# Patient Record
Sex: FEMALE | Race: WHITE | NOT HISPANIC OR LATINO | Employment: OTHER | URBAN - METROPOLITAN AREA
[De-identification: names, ages, dates, MRNs, and addresses within clinical notes are randomized per-mention and may not be internally consistent; named-entity substitution may affect disease eponyms.]

---

## 2018-07-10 ENCOUNTER — DOCUMENTATION ONLY (OUTPATIENT)
Dept: ORTHOPEDICS | Facility: CLINIC | Age: 68
End: 2018-07-10

## 2018-07-10 NOTE — PROGRESS NOTES
ELIN Sanchez arrived to the [place] location today, for an adj/eval for bilateral afo's.    O.Goal.  To help give ankle stability, reduce foot drop, and help with ADL's.  Pt. has CMT and is scheduled to see Dr. Francis next month.    A.  At this time, the patient arrived wearing bilateral PLS custom AFO's, in which she stated are approx. 8 years old.  The right afo has a large crack on the Achilles area.  I recommended that she have this brace replaced ASAP and made her aware that it could break completely at any time.  She has a pair of braces that are approximately 1 year old she received from a different O&P location in [place].  She stated that she has never worn them because they cause pain and that she does not feel comfortable with the company that made them.  I did have her try on the right one and found it to be very tight on the lateral and medial portion.  I do not see a relief for her prominent navicular.  I have recommended that she go back to the company that made these and see if they would work with her on adjustments or refabricating a new set.  She stated that she would rather work with me on a different pair.  The patient has had a right fusion.  She also states that she will be getting a right knee replacement in the future.  She has genu valgum.  Pt. has weak dorsi strength bilateral and has bilateral pes planus, but the right side is much more extreme.  The patient has asked about turbomed afo's.  The may work well for her being she is used to PLS AFO's already.  The plan is to try bilateral Turbomed AFO's with custom FO's at her next appt.  If they do not work well for her, then I will cast her for new custom PLS AFO's with removable fo's.  Pt. wears size 8.5 shoes.  I have taken biofoam impressions and will order Turbomed afo's in her size.    P.  Pt. is schedule to arrive back in 1 week for a fitting.  Pt. has been instructed to contact our facility with any future questions and/or concerns.

## 2018-07-17 ENCOUNTER — DOCUMENTATION ONLY (OUTPATIENT)
Dept: ORTHOPEDICS | Facility: CLINIC | Age: 68
End: 2018-07-17

## 2018-07-17 NOTE — PROGRESS NOTES
S.  Patient arrived to our [place] location today for a fitting of new bilateral TurboMed AFO's.    O.Goal.  To help reduce foot drop and help with ambulation/stairs/adl's.    A.  At this time, I have assembled/modified the AFO s to fit over her hiking boots.    I have applied the attachment kit to the footwear and also provided/fit bilateral custom fo's (xpe with puff top covers) to fit into her footwear to give her arch support and protection.  After the braces were assembled/fit, I have instructed the patient proper donning, doffing, cleaning, attachments, wear-in period, and skin care/observation.  The patient was very satisfied with the overall fit/comfort.  Pt. did not show any signs of foot drop during gait with the braces on.  I have provided the  s instructions with the braces.  Pt. was very happy with the braces overall and did get a little choked up because of how exited she was with the new braces.    P.  Pt. has been instructed to contact our facility with any future questions and/or concerns.    Raymundo HILLMAN [facility] Licensed Orthotist , ABC Certified Orthotist

## 2018-07-31 ENCOUNTER — PRE VISIT (OUTPATIENT)
Dept: NEUROLOGY | Facility: CLINIC | Age: 68
End: 2018-07-31

## 2018-07-31 DIAGNOSIS — G60.9 HEREDITARY AND IDIOPATHIC PERIPHERAL NEUROPATHY: Primary | ICD-10-CM

## 2018-07-31 RX ORDER — ALBUTEROL SULFATE 0.83 MG/ML
2.5 SOLUTION RESPIRATORY (INHALATION) EVERY 6 HOURS PRN
COMMUNITY

## 2018-07-31 RX ORDER — ESTRADIOL 0.1 MG/G
0.1 CREAM VAGINAL
COMMUNITY

## 2018-07-31 RX ORDER — ALBUTEROL SULFATE 90 UG/1
1-2 AEROSOL, METERED RESPIRATORY (INHALATION) EVERY 4 HOURS PRN
COMMUNITY

## 2018-07-31 RX ORDER — FLUTICASONE PROPIONATE 50 MCG
2 SPRAY, SUSPENSION (ML) NASAL DAILY PRN
COMMUNITY

## 2018-07-31 NOTE — TELEPHONE ENCOUNTER
CMT NEW PATIENT    Family History of CMT? Father, grandmother and possibly more    When did their symptoms start? Clumsy in teen years. Diagnosed in 20s    Who initially gave them the diagnosis? A neurologist at [facility] in the 70s. Saw another neurologist in the 90's at [facility]. PCP, orthotist and podiatrist has been managing her CMT    Has genetic testing been completed? If yes, what type of CMT do they have? No    Hammertoes? Yes    [place]? Previous ankle and arch surgeries     Ankle weakness/foot drop? Yes    Hand weakness? Issues with fine motor     Currently using any assistive devices? Walking sticks when hiking or on uneven ground.     Wearing orthotics (AFO s or foot orthotics)? Yes    Records needed:     Previous EMG report: Yes has had 2 at [facility]    Genetic test results N/A    Office visit notes from previous neurologists that pertain to CMT: should be coming     MRI imaging of the brain and spine (need report and images pushed/archived).     What to expect during your visit:    The CMT clinic takes place every 1st Tuesday of the month. It is a multi-disciplinary clinic where the pt sees multiple specialists during their visit. The team consists of the neurologist, physical therapist, occupational therapist, orthotist, genetic counselor,  and a representative from the [employer] .     If they are scheduled in the morning they will be here from about 8:30am to about 12:00pm, this may vary depending on how the schedule falls.    If they are scheduled in the afternoon, we will need them here from 12:15pm to about 4:30pm.    You will be receiving a new pt packet in the mail. It is imperative that you have this completed and can bring this with you to the appt. Dr Francis requires that all new pt s fill this questionnaire out.   We will be in touch with you closer to the appt date to update your chart and complete a pre-visit .

## 2018-08-07 ENCOUNTER — HOSPITAL ENCOUNTER (OUTPATIENT)
Dept: PHYSICAL THERAPY | Facility: CLINIC | Age: 68
Setting detail: THERAPIES SERIES
End: 2018-08-07
Attending: OCCUPATIONAL THERAPIST
Payer: COMMERCIAL

## 2018-08-07 ENCOUNTER — OFFICE VISIT (OUTPATIENT)
Dept: NURSING | Facility: CLINIC | Age: 68
End: 2018-08-07
Payer: COMMERCIAL

## 2018-08-07 ENCOUNTER — HOSPITAL ENCOUNTER (OUTPATIENT)
Dept: OCCUPATIONAL THERAPY | Facility: CLINIC | Age: 68
Setting detail: THERAPIES SERIES
End: 2018-08-07
Attending: OCCUPATIONAL THERAPIST
Payer: COMMERCIAL

## 2018-08-07 ENCOUNTER — DOCUMENTATION ONLY (OUTPATIENT)
Dept: ORTHOPEDICS | Facility: CLINIC | Age: 68
End: 2018-08-07

## 2018-08-07 ENCOUNTER — OFFICE VISIT (OUTPATIENT)
Dept: NEUROLOGY | Facility: CLINIC | Age: 68
End: 2018-08-07
Payer: COMMERCIAL

## 2018-08-07 VITALS
OXYGEN SATURATION: 95 % | SYSTOLIC BLOOD PRESSURE: 145 MMHG | HEART RATE: 66 BPM | HEIGHT: 63 IN | BODY MASS INDEX: 31.89 KG/M2 | DIASTOLIC BLOOD PRESSURE: 75 MMHG | WEIGHT: 180 LBS

## 2018-08-07 DIAGNOSIS — G60.0 CMT (CHARCOT-MARIE-TOOTH DISEASE): Primary | ICD-10-CM

## 2018-08-07 DIAGNOSIS — G60.0 PERONEAL MUSCULAR ATROPHY: Primary | ICD-10-CM

## 2018-08-07 LAB — MISCELLANEOUS TEST: NORMAL

## 2018-08-07 PROCEDURE — 40000250 ZZH STATISTIC VISIT MD CLINIC: Performed by: OCCUPATIONAL THERAPIST

## 2018-08-07 PROCEDURE — 97161 PT EVAL LOW COMPLEX 20 MIN: CPT | Mod: GP | Performed by: PHYSICAL THERAPIST

## 2018-08-07 PROCEDURE — 97165 OT EVAL LOW COMPLEX 30 MIN: CPT | Mod: GO | Performed by: OCCUPATIONAL THERAPIST

## 2018-08-07 PROCEDURE — 97535 SELF CARE MNGMENT TRAINING: CPT | Mod: GO | Performed by: OCCUPATIONAL THERAPIST

## 2018-08-07 PROCEDURE — 97530 THERAPEUTIC ACTIVITIES: CPT | Mod: GP | Performed by: PHYSICAL THERAPIST

## 2018-08-07 PROCEDURE — 99202 OFFICE O/P NEW SF 15 MIN: CPT | Performed by: PSYCHIATRY & NEUROLOGY

## 2018-08-07 PROCEDURE — 40000250 ZZH STATISTIC VISIT MD CLINIC: Performed by: PHYSICAL THERAPIST

## 2018-08-07 NOTE — NURSING NOTE
"Laura Vann's goals for this visit include:   Chief Complaint   Patient presents with     Consult     She requests these members of her care team be copied on today's visit information: PCP    PCP: Tatiana Del Castillo    Referring Provider:  Referred Self, MD  No address on file    /75 (BP Location: Left arm, Patient Position: Sitting, Cuff Size: Adult Large)  Pulse 66  Ht 1.588 m (5' 2.5\")  Wt 81.6 kg (180 lb)  SpO2 95%  BMI 32.4 kg/m2    Do you need any medication refills at today's visit? n  "

## 2018-08-07 NOTE — PROGRESS NOTES
OUTPATIENT PHYSICAL THERAPY CLINIC NOTE  Laura Vann     YOB: 1950  [id number]    Type of visit:         Evaluation     Date of service: 8/7/2018    Referring provider: Adrian Francis MD    Others present at visit:  None    Medical diagnosis:   CMT    Date of diagnosis: 1976    Pertinent history of current problem (include personal factors and/or comorbidities that impact the plan of care): Now wearing the Turbo Med braces. She likes to garden and ride bike.  She works full time but is retiring in October.  She currently lives in [place], [place].  She reports increased standing balance with her new braces.  Right ankle fusion and surgery on hammer toe.    Cardio-respiratory status:  No concerns      Living environment:  House    Living environment barriers:  12 stairs within home (1 railing present).  Lives in an old farmhouse.  2 step entrance with rail     Current assistance/living environment:  Lives with spouse      Current mobility equipment:  Orthotics: Turbo Meds  Walking sticks     Current ADL equipment:  Extended tub bench    Technology used: Smart phone, computer.  Works in [employer]    Patient concerns/goals: Gait efficiency with new braces.    Evaluation   Interview completed.   Pain assessment:  Pain denied  Right knee discomfort when she is on her feet.  She knows she needs a Right TKA and has met with an orthopedist.     Range of motion: Right ankle decreased active movement due to fusion     Manual muscle testing:  Bilateral ankle dorsiflexors=1/5, right knee flexion/extension=4+/5, left knee flexion/extension=5/5 bilateral hip flexion, abduction and adduction=5/5   Gait:  Bilateral Turbo braces with limited arm swing and trendelenberg gait   25 Foot Timed Walk= 7.19 seconds taking 14 steps with bilateral Turbo braces.   Cognition:  None       Fall Risk Screen:   Has the patient fallen 2 or more times in the last year? No      Has the patient fallen and had an injury in the past year?  No       Timed Up and Go Score: 8.83    Is the patient a fall risk? Yes, department fall risk interventions implemented     Impairments:  Fatigue  Muscle atrophy  Balance  Range of motion     Treatment diagnosis:  Impaired mobility  Impaired activities of daily living    Clinical Presentation: Evolving/Changing  Clinical Presentation Rationale: Increased need for walking poles for gait, decreased balance, fatigue  Clinical Decision Making (Complexity): Low complexity     Recommendations/Plan of care:  1 session evaluation & treatment.     Goals:   Target date: 8/7/18  Patient, family and/or caregiver will verbalize understanding of evaluation results and implications for functional performance.  Patient, family and/or caregiver will verbalize/demonstrate understanding of home program.  Patient, family and/or caregiver will verbalize energy management techniques appropriate for status and setting.    Educational assessment/barriers to learning:   No barriers noted     Treatment provided this date:   Therapeutic activities, 15 minutes    Response to treatment/recommendations: Laura was instructed in and performed standing heelcord, gastroc and soleus lengthening with picture given on 1st toe flexion.  She is able to demonstrate standing balance activities of single leg stance with UE support and marching.  Educated and instructed on implementing energy conservation techniques of breaking up tasks and frequent position changes.  She will look into recumbent bike options for exercise and walking with walking poles.    Goal attainment:  All goals met     Risks and benefits of evaluation/treatment have been explained.  Patient, family and/or caregiver are in agreement with Plan of Care.     Timed Code Treatment Minutes: 15    Total Treatment Time (sum of timed and untimed services): 45    Signature: Armida Ying, PT   Date: 8/7/2018

## 2018-08-07 NOTE — PROGRESS NOTES
OUTPATIENT OCCUPATIONAL THERAPY CLINIC NOTE  Laura Vann     YOB: 1950  [id number]    Type of visit:  Evaluation and Treatment            Date of service: 2018    Referring provider: Dr. Adrian Francis    Others present at visit: None    Medical diagnosis: CMT      Date of diagnosis:     Additional Occupational Profile Information (patterns of daily living, interests, values and needs): Now wearing the Turbo Med braces. She likes to garden and ride bike. Right ankle fusion and surgery on hammer toe.   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    Living environment:  House     Living environment barriers:  12 stairs within home (1 railing present).  Lives in an old farmhouse.  2 step entrance with rail      Current assistance/living environment:  Lives with spouse.   has to assist with clasping jewelry.   She avoids fastening buttons      Current mobility equipment:  Orthotics: Turbo Meds  Walking sticks (occasional)      Current ADL equipment:  Extended tub bench (has available but not currently using)  Uses jar openers     Technology used: Smart phone, computer.  Works in [employer]     Patient concerns/goals: decreasing grasp for opening containers, clasping jewelry. Pt has a lot of dificulty with buttons so primarily avoids fastening them.     Evaluation   Interview completed.    Range of motion:  WFL    Manual muscle testing:  strength testin lb R hand, 40 lb left hand.  Intrinsic hand weakness 4/5    Cognition:  WNL      Fall Risk Screen:   Has the patient fallen 2 or more times in the last year? No                            Has the patient fallen and had an injury in the past year? No                Timed Up and Go Score: Defer to PT     Is the patient a fall risk? Yes, department fall risk interventions implemented      Impairments:  Fatigue  Muscle atrophy  Balance  Range of motion      Treatment diagnosis:  Impaired mobility  Impaired activities  "of daily living     Assessment of Occupational Performance: 1-3 Performance Deficits  Identified Performance Deficits (ie: feeding, social skills): dressing, grooming, household management  Clinical Decision Making (Complexity): Low complexity     Recommendations/Plan of care:  Patient would benefit from interventions to enhance safety and independence.  Rehab potential good for stated goals.  1 session evaluation & treatment.    Goals:   Target date:   Patient, family and/or caregiver will verbalize understanding of evaluation results and implications for functional performance.  Patient, family and/or caregiver will verbalize/demonstrate understanding of compensatory methods /equipment to enhance functional independence and safety.  Patient, family and/or caregiver will verbalize/demonstrate understanding of home program.  Patient, family and/or caregiver will verbalize/demonstrate understanding of positioning techniques/equipment.      Educational assessment/barriers to learning:  No barriers noted      Treatment provided this date:   Self care/home management, 15 minutes. Educated on strategies and adaptive equipment to compensate for decreased pinch strength, including button aide and/or \"pocket dresser,\"  Zipper pulls, multi-use opener for cans/bottles. Educated on use of Pen Again, which pt reports having previous experience with.     Response to treatment/recommendations: Pt demo'd understanding of all instruction    Goal attainment:  All goals met    Risks and benefits of evaluation/treatment have been explained.  Patient, family and/or caregiver are in agreement with Plan of Care.     Timed Code Treatment Minutes: 15  Total Treatment Time (sum of timed and untimed services): 30    Signature: SOWMYA Murcia/L  Date: 8/7/2018  "

## 2018-08-07 NOTE — MR AVS SNAPSHOT
After Visit Summary   2018    Laura Vann    MRN: [id number]           Patient Information     Date Of Birth          1950        Visit Information        Provider Department      2018 1:00 PM Tosha Galvan [facility]        Today's Diagnoses     Peroneal muscular atrophy    -  1       Follow-ups after your visit        Who to contact     If you have questions or need follow up information about today's clinic visit or your schedule please contact [facility] directly at [phone].  Normal or non-critical lab and imaging results will be communicated to you by VoAPPshart, letter or phone within 4 business days after the clinic has received the results. If you do not hear from us within 7 days, please contact the clinic through VoAPPshart or phone. If you have a critical or abnormal lab result, we will notify you by phone as soon as possible.  Submit refill requests through StreetFire or call your pharmacy and they will forward the refill request to us. Please allow 3 business days for your refill to be completed.          Additional Information About Your Visit        MyChart Information     Street[employer] is an electronic gateway that provides easy, online access to your medical records. With StreetFire, you can request a clinic appointment, read your test results, renew a prescription or communicate with your care team.     To sign up for StreetFire visit the website at [email or web address]   You will be asked to enter the access code listed below, as well as some personal information. Please follow the directions to create your username and password.     Your access code is: [email or web address]  Expires: 2018  3:18 PM     Your access code will  in 90 days. If you need help or a new code, please contact your [facility] or call [phone] for assistance.        Care EveryWhere ID     This is your Care EveryWhere ID. This  could be used by other organizations to access your [place] medical records  [id number]         Blood Pressure from Last 3 Encounters:   08/07/18 145/75    Weight from Last 3 Encounters:   08/07/18 81.6 kg (180 lb)              We Performed the Following     Comprehensive Neuropathy Panel: Laboratory Miscellaneous Order     Send outs misc test        Primary Care Provider Office Phone # Fax #    Tatiana Beasleyncer, CHRISTIAN [phone]       [facility] [address]   [facility] [address]        Equal Access to Services     [facility]: Hadii aad ku hadasho Soomaali, waaxda luqadaha, qaybta kaalmada adeegyada, waxay idiin hayaan adeeg kharabryan laPritiaafrankie . So [facility] [phone].    ATENCIÓN: Si habla español, tiene a tan disposición servicios gratuitos de asistencia lingüística. [facility] [phone].    We comply with applicable federal civil rights laws and [place] laws. We do not discriminate on the basis of race, color, national origin, age, disability, sex, sexual orientation, or gender identity.            Thank you!     Thank you for choosing [facility]  for your care. Our goal is always to provide you with excellent care. Hearing back from our patients is one way we can continue to improve our services. Please take a few minutes to complete the written survey that you may receive in the mail after your visit with us. Thank you!             Your Updated Medication List - Protect others around you: Learn how to safely use, store and throw away your medicines at www.disposemymeds.org.          This list is accurate as of 8/7/18 11:59 PM.  Always use your most recent med list.                   Brand Name Dispense Instructions for use Diagnosis    * albuterol 108 (90 Base) MCG/ACT Inhaler    PROAIR HFA/PROVENTIL HFA/VENTOLIN HFA     Inhale 1-2 puffs into the lungs every 4 hours as needed for shortness of breath / dyspnea or wheezing    Hereditary and idiopathic peripheral  neuropathy       * albuterol (2.5 MG/3ML) 0.083% neb solution      Take 2.5 mg by nebulization every 6 hours as needed for shortness of breath / dyspnea or wheezing    Hereditary and idiopathic peripheral neuropathy       estradiol 0.1 MG/GM cream    ESTRACE     Place 0.1 g vaginally three times a week    Hereditary and idiopathic peripheral neuropathy       fluticasone 50 MCG/ACT spray    FLONASE     Spray 2 sprays into both nostrils daily as needed for rhinitis or allergies    Hereditary and idiopathic peripheral neuropathy       loratadine-pseudoePHEDrine 5-120 MG per 12 hr tablet    CLARITIN-D 12-hour     Take 1 tablet by mouth 2 times daily as needed for allergies    Hereditary and idiopathic peripheral neuropathy       mometasone-formoterol 100-5 MCG/ACT oral inhaler    DULERA     Inhale 2 puffs into the lungs 2 times daily    Hereditary and idiopathic peripheral neuropathy       MULTIVITAMIN ADULT PO      Take 1 tablet by mouth daily    Hereditary and idiopathic peripheral neuropathy       TAPAZOLE PO      Take 2.5 mg by mouth three times a week    Hereditary and idiopathic peripheral neuropathy       VITAMIN D (CHOLECALCIFEROL) PO      Take 4,000 Units by mouth daily    Hereditary and idiopathic peripheral neuropathy       * Notice:  This list has 2 medication(s) that are the same as other medications prescribed for you. Read the directions carefully, and ask your doctor or other care provider to review them with you.

## 2018-08-07 NOTE — DISCHARGE INSTRUCTIONS
"OT recommendations from 2018    1. Zipper pulls  2. Dressing aides:  Pocket [place] or \"button aide\"  3. Pen Again writing tool  4. Spring-loaded tweezers (available in the beading section at a [facility] store)    Ad Lopez OTR/L  [facility]  [email or web address], Phone: [phone]  [phone]    "

## 2018-08-07 NOTE — PATIENT INSTRUCTIONS
Stop at the lab on your way out today. Tosha Galvan will be in touch with you regarding results.    Return in 1 year.

## 2018-08-07 NOTE — MR AVS SNAPSHOT
After Visit Summary   2018    Laura Vann    MRN: [id number]           Patient Information     Date Of Birth          1950        Visit Information        Provider Department      2018 2:00 PM Adrian Francis MD [facility]        Care Instructions    Stop at the lab on your way out today. Tosha Galvan will be in touch with you regarding results.    Return in 1 year.           Follow-ups after your visit        Follow-up notes from your care team     Return in about 1 year (around 2019).      Who to contact     If you have questions or need follow up information about today's clinic visit or your schedule please contact [facility] directly at [phone].  Normal or non-critical lab and imaging results will be communicated to you by MyChart, letter or phone within 4 business days after the clinic has received the results. If you do not hear from us within 7 days, please contact the clinic through Zeetlhart or phone. If you have a critical or abnormal lab result, we will notify you by phone as soon as possible.  Submit refill requests through Kutenda or call your pharmacy and they will forward the refill request to us. Please allow 3 business days for your refill to be completed.          Additional Information About Your Visit        MyChart Information     Kutenda is an electronic gateway that provides easy, online access to your medical records. With Kutenda, you can request a clinic appointment, read your test results, renew a prescription or communicate with your care team.     To sign up for Kutenda visit the website at [email or web address]   You will be asked to enter the access code listed below, as well as some personal information. Please follow the directions to create your username and password.     Your access code is: [email or web address]  Expires: 2018  3:18 PM     Your access code will  in 90 days. If you need help or a new  "code, please contact your [facility] or call [phone] for assistance.        Care EveryWhere ID     This is your Care EveryWhere ID. This could be used by other organizations to access your [facility] medical records  [id number]        Your Vitals Were     Pulse Height Pulse Oximetry BMI (Body Mass Index)          66 5' 2.5\" (1.588 m) 95% 32.4 kg/m2         Blood Pressure from Last 3 Encounters:   08/07/18 145/75    Weight from Last 3 Encounters:   08/07/18 180 lb (81.6 kg)              Today, you had the following     No orders found for display       Primary Care Provider Office Phone # Fax #    Tatiana Del Castillo [phone]       [address]   [facility] [address]        Equal Access to Services     [facility] : Hadii aad ku hadasho Soomaali, waaxda luqadaha, qaybta kaalmada adeegyada, waxay idiin hayaan adeanna kharabryan lagos . So [facility] [phone].    ATENCIÓN: Si habla español, tiene a tan disposición servicios gratuitos de asistencia lingüística. Robert al [phone].    We comply with applicable federal civil rights laws and [place] laws. We do not discriminate on the basis of race, color, national origin, age, disability, sex, sexual orientation, or gender identity.            Thank you!     Thank you for choosing [facility]  for your care. Our goal is always to provide you with excellent care. Hearing back from our patients is one way we can continue to improve our services. Please take a few minutes to complete the written survey that you may receive in the mail after your visit with us. Thank you!             Your Updated Medication List - Protect others around you: Learn how to safely use, store and throw away your medicines at www.disposemymeds.org.          This list is accurate as of 8/7/18  3:18 PM.  Always use your most recent med list.                   Brand Name Dispense Instructions for use " Diagnosis    * albuterol 108 (90 Base) MCG/ACT Inhaler    PROAIR HFA/PROVENTIL HFA/VENTOLIN HFA     Inhale 1-2 puffs into the lungs every 4 hours as needed for shortness of breath / dyspnea or wheezing    Hereditary and idiopathic peripheral neuropathy       * albuterol (2.5 MG/3ML) 0.083% neb solution      Take 2.5 mg by nebulization every 6 hours as needed for shortness of breath / dyspnea or wheezing    Hereditary and idiopathic peripheral neuropathy       estradiol 0.1 MG/GM cream    ESTRACE     Place 0.1 g vaginally three times a week    Hereditary and idiopathic peripheral neuropathy       fluticasone 50 MCG/ACT spray    FLONASE     Spray 2 sprays into both nostrils daily as needed for rhinitis or allergies    Hereditary and idiopathic peripheral neuropathy       loratadine-pseudoePHEDrine 5-120 MG per 12 hr tablet    CLARITIN-D 12-hour     Take 1 tablet by mouth 2 times daily as needed for allergies    Hereditary and idiopathic peripheral neuropathy       mometasone-formoterol 100-5 MCG/ACT oral inhaler    DULERA     Inhale 2 puffs into the lungs 2 times daily    Hereditary and idiopathic peripheral neuropathy       MULTIVITAMIN ADULT PO      Take 1 tablet by mouth daily    Hereditary and idiopathic peripheral neuropathy       TAPAZOLE PO      Take 2.5 mg by mouth three times a week    Hereditary and idiopathic peripheral neuropathy       VITAMIN D (CHOLECALCIFEROL) PO      Take 4,000 Units by mouth daily    Hereditary and idiopathic peripheral neuropathy       * Notice:  This list has 2 medication(s) that are the same as other medications prescribed for you. Read the directions carefully, and ask your doctor or other care provider to review them with you.

## 2018-08-07 NOTE — LETTER
October 18, 2018       TO: Laura Vann  [address] [place] [address]       DearMs.Edwar,    I am writing in regards to your genetic test results.  As you know you were seen by Dr. 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  A copy of the results are below.    There was a variant identified in the following gene associated with recessive conditions: SPG11.  Recessive conditions are caused by a variant in both copies of the gene within the pair.  Genetic testing has only identified a variant in one copy of the gene.  Therefore this variant is is not likely to cause your symptoms.  It could mean you are a carrier or at risk to have child affected with this condition.   The variant(s) is/are classified as variants of unknown significance (VOUS).        SPG11 variant is associated hereditary spastic parapelegia.  This condition is characterized by  progressive muscle stiffness (spasticity) and the development of paralysis of the lower limbs (paraplegia).  Having a variant in once copy of the gene within the pair implies that the individual is a carrier, if the variant is disease causing.    In all recessive conditions, both parents have to be carriers to have an affected child.  When both parents are carriers there is 25% chance, in each pregnancy, to have a children who at risk to develop the condition.   Your first degree relatives (siblings, parents and children) have a 1 out of 2 or 50% of also possessing the variant identified.      There are many different features of CMTX; however, most do not occur in every person with the condition. The severity of symptoms varies greatly. Every person with CMTX is unique in how the condition affects him or her.    Signs and symptoms of CMTX may include the following:    Characterized  by a moderate to severe motor and sensory neuropathy    There is a range in severity some individuals with CMTX mutations have mild or no symptoms.  The severity can range within a family.    Symptoms typically develop between age five and 25 years, with onset commonly within the first decade in males.     Hearing loss is occasionally reported and auditory evoked potentials may be abnormal    CMTX represents at least 10%-20% of those affected with the CMT    Occasional signs of central nervous system involvement have been reported.    CMTX is caused by a variant or harmful change, in the GJB1 gene. Genes are units of information that instruct the body how to grow and develop. This gene is responsible for making a protein that resides in special cells that surround the peripheral nerves called Schwann cells. Schwann cells produce myelin, which is a substance that functions like insulation around electrical wires. When the Schwann cells are not working correctly, myelin is not produced correctly, and the nerve signals take much longer to reach the muscle. If the muscles are not efficiently getting signals from the nerves they will eventually become weak. 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 The variant found in your DNA is technically referred to as c.435C>T, Ofp32Itl. Now that we have identified the GJB1 variant in your DNA, we have the ability to test other family members.  Also this allows for prenatal testing and preimplantation diagnosis.  This diagnosis can also be established by clinic evaluation and genetic testing is not essential to confirming the diagnosis.  Genetic confirmation may be necessary for involvement in future clinical trials.    CMTX 'runs in families' in a X-linked pattern.  The GJB1 gene is located on the X chromosomes, which is one of the sex  chromosomes. Males have an X and Y chromosome, while females have 2 X chromosomes. In X-linked Dominant condition all individuals who possess the mutation will develop some symptoms regardless of gender; however their risk to pass it on is dependent on the gender. Females have 50% chance of passing on the mutation to either their sons or daughters. All the daughters of an affected Male will possess the mutation and none of his sons will inherit the mutation. There are typically affected individuals in each generation; however there is no father to son transmission.       The followings recommendations for individuals affect with CMTX:  1. Genetic testing is available to other family members if they are interested.  2. Some medications can be potentially toxic to individuals with CMT and should be avoided.  An up-to-date list is available at https://www.ncbi.nlm.nih.gov/books/VUE4291/bin/cmt_and_medications.pdf    3. There are several research studies being done in CMT, and more information about this studies are available at http://www.cmtausa.org/research/cmt-clinical-trial-registry/      It was a pleasure to meet Laura in the [facility] clinic, and we look forward to seeing you at your follow-up appointment. In the meantime, if you have any additional questions or concerns, please feel free to contact me at [phone].    Sincerely,    Tosha Galvan MS, [facility]  Genetic Counselor  Phone: [phone]      Resulted Orders   Comprehensive Neuropathy Panel: Laboratory Miscellaneous Order   Result Value Ref Range    Miscellaneous Test         Specimen Received, Reordered and sent to Performing laboratory - Report to follow upon   completion.     Send outs misc test   Result Value Ref Range    Lab Scanned Result SEND OUTS MISC TEST-Scanned (A)

## 2018-08-07 NOTE — LETTER
2018         RE: Laura Vann  [address]  [facility] [address]        Dear Colleague,    Thank you for referring your patient, Laura Vann, to the [facility]. Please see a copy of my visit note below.    182021    2018      Tatiana Del Castillo NP   [facility] [address]      Patient:  Laura Vann   MRN:  [id number]   :  1950      Dear Dr. Del Castillo:      I saw Laura Vann in neuromuscular consultation as a new patient at the [facility], where she presented for evaluation and management of CMT.  Ms. Vann is a 67-year-old woman with a decades long history of clumsiness and imbalance and occasional falls.  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  She is otherwise in generally good health.  Her full past medical history, social history, allergy list, medication list, family history, occupational history and system review are documented in the electronic medical record and were personally reviewed by me today.  She presents today for multidisciplinary evaluation and management as well as further information about current diagnostic categories and research in CMT.      EXAMINATION:  The patient is a healthy-appearing woman.  Speech, language and affect are normal.  Cranial nerves II-XII are normal.  Motor examination demonstrates distal atrophy and weakness in a fairly symmetric pattern and moderate deformity at the right knee and ankle.  The former is being managed and ultimately will require knee replacement.  Manual muscle testing demonstrates the following findings, right/left:  Strength is full except FDI 4-/4-, APB 3/4-, ankle plantar flexion 4/4-, ankle dorsiflexion 0 bilaterally.  Sensory examination reveals reduced perception of position at  the toes with preserved position sense at the ankles.  Pinprick perception is preserved and perception of light touch is within broad normal limits.  Rydel-Seiffer Vibration scores are 4 at the metacarpophalangeal joints and 0 at the tibial tuberosities and feet.  She does walk independently with ankle-foot orthosis.      We discussed the natural history and management of CMT at some length today.  We will proceed with genetic testing.  She met with other members of our multidisciplinary care team today as well.  We discussed medications to avoid to prevent progression of neuropathy.  She will return in 6-12 months and as needed.         Sincerely,      SVETLANA INIGUEZ MD             D: 2018   T: 2018   MT: MARY      Name:     SARI SALVADOR   MRN:      [id number]        Account:      [id number]   :      1950      Document: [id number]       cc: Tatiana Del Castillo NP       Again, thank you for allowing me to participate in the care of your patient.        Sincerely,        Svetlana Iniguez MD

## 2018-08-07 NOTE — IP AVS SNAPSHOT
"                  MRN:[id number]                      After Visit Summary   2018    Laura Vann    MRN: [id number]           Visit Information        Provider Department      2018  3:15 PM Ad Lopez, OT [facility]          Further instructions from your care team       OT recommendations from 2018    1. Zipper pulls  2. Dressing aides:  Pocket [place] or \"button aide\"  3. Pen Again writing tool  4. Spring-loaded tweezers (available in the beading section at a [employer])    Ad Lopez, OTR/L  [facility]  [email or web address], Phone: [phone]  [phone]      MyChart Information     NewGoTos lets you send messages to your doctor, view your test results, renew your prescriptions, schedule appointments and more. To sign up, go to [email or web address] . Click on \"Log in\" on the left side of the screen, which will take you to the Welcome page. Then click on \"Sign up Now\" on the right side of the page.     You will be asked to enter the access code listed below, as well as some personal information. Please follow the directions to create your username and password.     Your access code is: [email or web address]  Expires: 2018  3:18 PM     Your access code will  in 90 days. If you need help or a new code, please call your [facility] clinic or [phone].        Care EveryWhere ID     This is your Care EveryWhere ID. This could be used by other organizations to access your [facility] medical records  [id number]        Equal Access to Services     ROSALIO CALDERON : Hadvimal Whatley, yobany phillips, betty niño. So [facility] [phone].    ATENCIÓN: Si habla español, tiene a tan disposición servicios gratuitos de asistencia lingüística. Llame al [phone].    We comply with applicable federal civil rights laws and [place] laws. We do " not discriminate on the basis of race, color, national origin, age, disability, sex, sexual orientation, or gender identity.

## 2018-08-07 NOTE — PROGRESS NOTES
S.  Patient was seen today, at Dr. Francis's [facility] clinic, for a follow up on her bilateral afo's.    O.Goal.  To help reduce foot drop.    A.  At this time, Laura states that her new bilateral Turbomed AFO's have been working well with the added custom foot orthotics.  She has tried a variety of shoes and has found a few pairs that she likes best with the braces.  Her only concern is right ankle stability and sliding her foot into her shoes without her toes curling.  To help with these concerns, I have added silicone spray to the top of the foot orthotics to help her foot slide easier.  I have also added a valgus control strap to her right Turbomed afo for more ankle control.  She was happy with the overall fit/comfort/support once the adjustments were complete.  The current Turbomeds clips were sitting back a little too far on the shoes and we discussed having them applied distal on the shoes to avoid the AFO's from disconnecting from the shoes.    P.  Pt. has been instructed to contact our facility with any future questions and/or concerns.

## 2018-08-08 NOTE — PROGRESS NOTES
2018      Tatiana Del Castillo NP   [facility] [place] [address] [place] [address]      Patient:  Laura Vann   MRN:  [id number]   :  1950      Dear Dr. Del Castillo:      I saw Laura Vann in neuromuscular consultation as a new patient at the [facility], where she presented for evaluation and management of CMT.  Ms. Vann is a 67-year-old woman with a decades long history of clumsiness and imbalance and occasional falls.  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  She is otherwise in generally good health.  Her full past medical history, social history, allergy list, medication list, family history, occupational history and system review are documented in the electronic medical record and were personally reviewed by me today.  She presents today for multidisciplinary evaluation and management as well as further information about current diagnostic categories and research in CMT.      EXAMINATION:  The patient is a healthy-appearing woman.  Speech, language and affect are normal.  Cranial nerves II-XII are normal.  Motor examination demonstrates distal atrophy and weakness in a fairly symmetric pattern and moderate deformity at the right knee and ankle.  The former is being managed and ultimately will require knee replacement.  Manual muscle testing demonstrates the following findings, right/left:  Strength is full except FDI 4-/4-, APB 3/4-, ankle plantar flexion 4/4-, ankle dorsiflexion 0 bilaterally.  Sensory examination reveals reduced perception of position at the toes with preserved position sense at the ankles.  Pinprick perception is preserved and perception of light touch is within broad normal limits.  Rydel-Seiffer Vibration scores are 4 at the metacarpophalangeal joints and 0 at the tibial  tuberosities and feet.  She does walk independently with ankle-foot orthosis.      We discussed the natural history and management of CMT at some length today.  We will proceed with genetic testing.  She met with other members of our multidisciplinary care team today as well.  We discussed medications to avoid to prevent progression of neuropathy.  She will return in 6-12 months and as needed.         Sincerely,      SVETLANA INIGUEZ MD             D: 2018   T: 2018   MT: MARY      Name:     SARI SALVADOR   MRN:      [id number]        Account:      [id number]   :      1950      Document: [id number]       cc: Tatiana Del Castillo NP

## 2018-08-08 NOTE — PROGRESS NOTES
[facility] GENETIC COUNSELING CONSULT NOTE  Laura was seen for a genetic counseling consult at the request of Dr. Francis to discuss genetic testing options for charcot-laura-tooth disease (CMT). Laura has a personal and family history of CMT.    FAMILY HISTORY  A detailed family history was taken and scanned into Laura s medical record. Laura's family history is significant for the following:  A paternal history of CMT in her father, paternal grandmother, paternal first cousin and paternal uncles    GENETIC COUNSELING  1. Charcot-Laura-Tooth Neuropathy (CMT) is common genetic form of peripheral neuropathy affecting 1 in 2500 individuals. In general, CMT is a condition that affects the peripheral nerves that lead to symptoms in the motor and sensory systems. There are two common categories of Charcot-Laura-Tooth Neuropathy (CMT). In general, CMT is a condition that affects the peripheral nerves that lead to symptoms in the motor and sensory systems. CMT Type 1 is the demyelinating form of the condition.  Demyelinating  means that there are problems with the actual nerves that send messages throughout the body. Specifically, the covering around the nerve (myelin) does not form properly. When the myelin is not formed the messages cannot travel the whole length of the nerve. CMT Type 2 is the axonal form of the condition.  Axonal  means that the messages being sent down the nerves are not clear or accurate. This leads to the muscles not being able to understand the garbled message that is being sent. There are also some less common types of CMT (3, 4, and X) that have a mixture of these axonal and neuronal symptoms. Even though all of the types of CMT have somewhat similar symptoms, there are many different gene changes that lead to CMT. Based on Laura's previous EMG and NCV her diagnosis is CMT1 or demyelinating.  2. CMT is most commonly inherited in autosomal dominant pattern, however, CMT can also be inherited in an X-linked  dominant or an autosomal recessive pattern.  Genetic confirmation would be necessary to provide additional information about inheritance.  3. The genetics of CMT are complex with over 80 genes associated with this diagnosis.  Despite the large number of genes involved; there are four genes, PMP22, GJB1, MPZ, and MFN2, account for over 90% of the genetic cause of CMT.  Testing for these genes is a cost effective way to begin testing.  If this testing is negative we can proceed with more comprehensive testing like next generation sequencing panels or whole exome sequencing.  4. We dicussed the Alnylam ActTM program from [facility], who [facility] to offer genetic testing at no charge for individuals who may carry a gene mutation known to be associated with hereditary ATTR (hATTR) amyloidosis. The Alnylam ActTM program was created to potentially increase genetic confirmation of hereditary neuropathy to allow individuals affected with these conditions to make more informed decisions about their health.  This testing includes sequencing and next generation sequencing of up to 83 genes that cause dominant, recessive, and X-linked hereditary neuropathies, including hATTR amyloidosis.  5. We dicussed the benefits and limitations of genetic confirmation. We dicussed that genetic confirmation can allow for inclusion in future clinical trials. Additionally, genetic confirmation can allow for pre-symptomatic test in at risk family members. While genetic confirmation can provide more information about the type of CMT it does not provide prognostic information due to the variability seen in the condition.  6. All immediate questions were answered. My contact information was provided for any additional questions or concerns.    PLAN  1. Laura elected to proceed with genetic testing her blood drawn on Aug 7, 2018 for the comprehensive neuropathy panel thought the Alnylam act performed at [facility].  2. I will contact Laura  with results, which I anticipate will take 4-6 weeks.    Tosha Galvan MS, [facility]  Genetic Counselor  Phone: [phone]

## 2018-08-14 ENCOUNTER — TELEPHONE (OUTPATIENT)
Dept: NEUROLOGY | Facility: CLINIC | Age: 68
End: 2018-08-14

## 2018-08-14 NOTE — TELEPHONE ENCOUNTER
1st attempt to schedule 1 yr follow up/CMT Clinic with Dr. Francis; tatianam.    Yessica [facility]~Specialty/Med Surg   [phone]

## 2018-09-06 ENCOUNTER — TELEPHONE (OUTPATIENT)
Dept: NEUROLOGY | Facility: CLINIC | Age: 68
End: 2018-09-06

## 2018-09-06 NOTE — TELEPHONE ENCOUNTER
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  Left message to call me to discuss.   A results letter will be mailed (see letters).    Tosha Galvan MS,[facility]  Genetic Counselor  Phone: [phone]    .

## 2018-09-06 NOTE — LETTER
September 6, 2018       TO: Laura Vann  [address] [place] [address]       DearMRaine,    am writing to follow-up on your genetic counseling consult on February 3, 2015 at the request of  in the Neuropathy Clinic at the [facility]. We met to discuss the genetics of Charcot-Laura Tooth type X (CMTX).    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 This means that at position 1712 in the connexin 32 gene there is a change from a G to a T which results in amino acid change from  A Proline to Histidine at  position 571 instead of glutamic acid. This may result in the prodcution of a dysfunctional protein.. There was a variant of unknown significance (VOUS) identified in the *** gene.  A VOUS is a genetic mutation for which were are unsure if the mutation is disease causing. To determine wether the VOUS is diease causing we look at several criteria including how frequently is this mutation seen in the general population, has any similar mutation been reported in the literature and if computer programs can predict whether or not the mutation affects how the protein associated with the gene is made or functions.  Looking at these criteria it seems unlikely this VOUS in the *** gene is disease causing.      CMT CLINICAL SUMMARY  CMT refers to a group of genetic condition, defined by involvement of peripheral nerves which can affect the motor system and the sensory system. CMT is considered one of the most common inherited disorders affecting 1 in 2500 individuals. CMT is a length dependent neuropathy typically first affecting the feet, than the hands. In most cases, the weakness is symmetric or the same on both sides of the body. The age of onset can range from childhood to adulthood. The severity and age onset is extremely  variable both between and within families. The variability can make it difficult to provide prognostic information. Based on your family history, EMG findings, genetic testing and clinical symptoms the cause of your CMT in you family is CMTX.    GENETICS OF CMTX  There are over 80 different genes associated with CMT. There is genetic testing available for several of them. Research is still being done to identify new genes associated with CMT. The type of CMT confirmed in your family is CMTX. CMTX is caused by mutations in the [id number] or GJB1 gene. This gene is responsible for making a protein that resides in special cells that surround the peripheral nerves called Schwann cells. Schwann cells produce myelin, which is a substance that functions like insulation around electrical wires. When the Schwann cells are not working correctly, myelin is not produced correctly, and the nerve signals take much longer to reach the muscle. If the muscles are not efficiently getting signals from the nerves they will eventually become weak. We will test this gene first and can add other genetic tests depending on the results.    INHERITANCE OF CMTX  CMTX is inherited in an X-linked dominant pattern. In X-linked inheritance the gene is located on the X chromosomes, which is one of the sex chromosomes. Males have an X and Y chromosome, while females have 2 X chromosomes. In X-linked Dominant condition all individuals who possess the mutation will develop some symptoms regardless of gender; however their risk to pass it on is dependent on the gender. Females have 50% chance of passing on the mutation to either their sons or daughters. All the daughters of an affected Male will posses the mutation and none of his sons will inherit the mutation. There are typically affected individuals in each generation; however there is no father to son transmission. Therefore the risk to her children is 50%. Your grandson is not at risk to develop  symptoms of CMTX.    GENETICS TESTING FOR CMT  We discussed that genetic testing currently available for CMT does provide genetic confirmation for all individuals affected with CMT. Most genetic testing for CMT involves sequencing the genes associated with CMT. We discussed that the limitations to genetic testing for CMT is the that not all the genes associated with CMT have been identified and because the genes associated with CMT are large and not well understood testing can turn up several variants of unknown significance. Variants of unknown significances are genetic changes or mutations that have not been reported in the literature and their role in causing CMT is unknown.       PRE-SYMPTOMATIC GENETIC TESTING FOR CMTX  Because a mutation has been identified in your family pre-symptomatic testing is an option.  The decision to have pre-symptomatic testing is a personal one. People pursue testing for several different reasons such as life planning, family planning, reassurance and knowledge. Individuals who are undergoing genetic testing sometimes have concerns about future insurability. We discussed that there are national and state laws that protect against genetic discrimination in regards to health insurance. Disability and life insurance are not protected by similar laws.               It was a pleasure to meet you. Please do not hesitate to contact me if I can be helpful in anyway.      Sincerely,    Tosha Galvan MS, [facility]  Genetic Counselor  Phone: [phone]

## 2018-09-08 LAB — LAB SCANNED RESULT: ABNORMAL

## 2019-09-24 ENCOUNTER — PRE VISIT (OUTPATIENT)
Dept: NEUROLOGY | Facility: CLINIC | Age: 69
End: 2019-09-24

## 2019-09-24 DIAGNOSIS — G60.0 CMT (CHARCOT-MARIE-TOOTH DISEASE): Primary | ICD-10-CM

## 2019-09-24 NOTE — TELEPHONE ENCOUNTER
Left message with detailed instructions requesting pt call the clinic back to complete previsit call. Maria M Bright RN

## 2019-10-03 NOTE — TELEPHONE ENCOUNTER
Writer tried to reach the pt to complete the CMT previsit but there was no answer, a message was left on their VM requesting a call back to the clinic.  Molly Reilly RN

## 2019-10-04 NOTE — TELEPHONE ENCOUNTER
"CMT RETURN PATIENT    What are your goals for this visit? Needs new orthotic insert and 1 year follow up    How has your mobility been since the last office visit? Fine  Have you had an increase in falls or any mobility concerns? Do you have any assistive devices (cane, walker, wheelchair) or are you interested in finding out more about how to obtain these? Turbo Med braces on both legs and a walking stick when out hiking or during winter.     Any concerns about hand weakness or pain? Do you have trouble doing things around the house such as opening jars, zipping/buttoning, writing or other activities of daily living? Hand are fine      Do you know what type of CMT you have? CMT 1X         Do you have any other questions for the genetic counselor? No    Do you wear braces such as foot orthotics or AFOs? If so, how are these working for you? If not, is this something you would like to discuss? Would like to see Raymundo for new inserts    We will have a  available in clinic in case you have any questions. They can help connect you with community resources and discuss disability. Not needed    There will also be a representative from the [facility] here in clinic.     To determine if you qualify for any CMT research studies, would you be interested in meeting with the research coordinator? Yes    \"We will contact you once the schedule has been completed to let you know what time you need to arrive. Disregard the automated appointment reminder call, I will call you directly to let you know what time to be here.\"    "

## 2019-10-08 ENCOUNTER — ANCILLARY PROCEDURE (OUTPATIENT)
Dept: ULTRASOUND IMAGING | Facility: CLINIC | Age: 69
End: 2019-10-08
Attending: PSYCHIATRY & NEUROLOGY
Payer: COMMERCIAL

## 2019-10-08 ENCOUNTER — OFFICE VISIT (OUTPATIENT)
Dept: NEUROLOGY | Facility: CLINIC | Age: 69
End: 2019-10-08
Payer: COMMERCIAL

## 2019-10-08 ENCOUNTER — TELEPHONE (OUTPATIENT)
Dept: NEUROLOGY | Facility: CLINIC | Age: 69
End: 2019-10-08

## 2019-10-08 ENCOUNTER — DOCUMENTATION ONLY (OUTPATIENT)
Dept: ORTHOPEDICS | Facility: CLINIC | Age: 69
End: 2019-10-08

## 2019-10-08 ENCOUNTER — RESEARCH ENCOUNTER (OUTPATIENT)
Dept: CARDIOLOGY | Facility: CLINIC | Age: 69
End: 2019-10-08

## 2019-10-08 VITALS
OXYGEN SATURATION: 97 % | DIASTOLIC BLOOD PRESSURE: 80 MMHG | HEART RATE: 60 BPM | SYSTOLIC BLOOD PRESSURE: 148 MMHG | BODY MASS INDEX: 32.36 KG/M2 | WEIGHT: 179.8 LBS

## 2019-10-08 DIAGNOSIS — M79.661 RIGHT CALF PAIN: ICD-10-CM

## 2019-10-08 DIAGNOSIS — G60.0 CMT (CHARCOT-MARIE-TOOTH DISEASE): Primary | ICD-10-CM

## 2019-10-08 PROCEDURE — 99214 OFFICE O/P EST MOD 30 MIN: CPT | Mod: GC | Performed by: PSYCHIATRY & NEUROLOGY

## 2019-10-08 PROCEDURE — 93971 EXTREMITY STUDY: CPT | Mod: RT

## 2019-10-08 ASSESSMENT — PAIN SCALES - GENERAL: PAINLEVEL: NO PAIN (0)

## 2019-10-08 NOTE — TELEPHONE ENCOUNTER
----- Message from Adrian Francis MD sent at 10/8/2019  3:25 PM CDT -----  Please notify the patient that her ultrasound is negative.

## 2019-10-08 NOTE — RESULT ENCOUNTER NOTE
Pt called, results communicated. See Telephone encounter. Maria M Bright RN  October 8, 2019  4:59 PM

## 2019-10-08 NOTE — PROGRESS NOTES
[facility] Neuromuscular Registry    IRB # [id number]  PI: Rik Schmidt MD, PhD  : Aura Lee    Patient was approached for possible participation for the above study. The current approved IRB consent form was discussed and explained to the patient.  It was discussed that involvement with the study is voluntary and refusal to participate would not involve penalty or decrease benefits at which the patient is entitled, and the subject may discontinue his/her involvement at any time without penalty or loss in benefits. We also discussed that this study does not have follow up visits or procedures. Patient was informed that an additional contact might occur if data needed was not found in patient s medical record. The patient was given time to review and ask any questions about the consent. Patient was shown contact information for PI and study staff in consent for future questions. Patient verbalized understanding of consent and study by restating the purpose, procedures, duration, risk, confidentiality of PHI, and voluntarily participation. Patient printed, signed and dated the consent and HIPAA form prior to study involvement. A copy was given to the patient for their records.     Subject Consent/HIPAA : SIGNED ON 10.08.2019

## 2019-10-08 NOTE — LETTER
"    10/8/2019         RE: Laura Vann  [address] [address]        Dear Colleague,    Thank you for referring your patient, Laura Vann, to the [facility]. Please see a copy of my visit note below.    History of Present Illness:    Laura Vann, 68 year old woman, returns to clinic for routine CMT follow up. She was clinically diagnosed with CMT based on EMG workups in her late 20s when she noticed that she was tripping a lot. She started wearing AFOs for support since 1998 to help with her gait and stability. She recalled having surgeries to correct for her flat foot on the right and to release tension on her toes on the left.  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 Patient family history is positive for CMT in her father and paternal grandmother. 2 brothers and a niece from her father side is also affected by CMT. Patient has 1 adopted son. Since last year, Laura had been wearing the TurboMed XTERN, which she loves. She finds it helped her a lot and is \"way better\" than the AFOs she had been using. Patient feels that her knee pain has improved since using the TurboMed. She reports gaining muscle in her arms and calf. With the TurboMed, Laura feels stable with her stance and walk. She does not use a cane. Patient retired from [employer] work last year and have been maintaining an active lifestyle with walks, gardening, and caring for a horse with her granddaughter. Patient denies new symptoms or worsening of weakness, numbness, or tingling in today's visit. She did endorse having increased sensitivity on the bottom of her feet, such that she avoids walking around barefoot. Patient also says she does not have a lot of strengths in her hands in general. Laura did want to discuss an occasional sharp pain on the lateral side of her right leg that radiates from the ankle to her right glutes. Patient worries that this may be " sciatica pain. She denies swelling or tenderness in the leg. She says the pain gets better with movement.      Mental state: Alert, appropriate, speech, language, and thought content normal.     Cranial nerves II-XII normal.    Sensory examination:   Right Left   Light touch Normal Normal   Vibration (timed)     Vibration (Rydell-Seiffer) MM5 MM5   Pin Normal Normal   Position     Legend:   MM = medial malleolus, TT = tibial tuberosity, K = patella, MCP = MCP joint  MF = mid-foot, DC = distal calf, MC = mid calf, PC = proximal calf      Manual muscle testing (A indicates atrophy):   Right Left   Shoulder abduction  5 5   Elbow extension 5 5   Elbow flexion 5 5   Wrist extension  5 5   Finger extension 4 4   FDI 4- 4   APB 3 4-   Hip flexion 5 5   Knee flexion 5 5   Knee extension 5 5   Dorsiflexion 0 0   Plantar flexion 4 4-     Muscle tone:   Right Left   Upper limb Normal Normal   Lower limb Normal Normal        Reflexes:   Right Left   Triceps 2 2   Biceps 2 2   Brachioradialis 2 2   Aditi 2 2   Patellar 2 2   Achilles 2 2   Plantar Flexor Flexor   Clonus Absent Absent      Coordination:  Finger-nose normal.  Heel-shin normal.  RRMs normal.    Gait:  Normal.      Impression:  Laura Vann is doing well generally since her last CMT visit, even possibly gaining muscle in her leg. The external ankle foot orthosis works really well for her. She can maintain stable stance, normal gait, and an active life with the help of external orthosis. Being active has helped with her knee pain. The pain on the lateral side of her leg is unlikely to be sciatica pain since it radiates up from her ankle. Pain relief with movement, and lack of swelling and tenderness decreases concern for DVT but an ultrasound was performed as a precaution and was negative. We suspect it is a local mechanical problem.       Recommendations:  1. Continue using the TurboMed external AFO   3. Consult physical therapy for muscle pain in leg    2.  Patient to return to CMT clinic for follow up in a year time.       Christa Holguin MS    Attestation: I personally examined the patient. The medical student acted as scribe and her note accurately reflects my findings.     Adrian Francis M.D.          Again, thank you for allowing me to participate in the care of your patient.        Sincerely,        Adrian Francis MD

## 2019-10-08 NOTE — PROGRESS NOTES
"History of Present Illness:    Laura Vann, 68 year old woman, returns to clinic for routine CMT follow up. She was clinically diagnosed with CMT based on EMG workups in her late 20s when she noticed that she was tripping a lot. She started wearing AFOs for support since 1998 to help with her gait and stability. She recalled having surgeries to correct for her flat foot on the right and to release tension on her toes on the left.  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 Patient family history is positive for CMT in her father and paternal grandmother. 2 brothers and a niece from her father side is also affected by CMT. Patient has 1 adopted son. Since last year, Laura had been wearing the TurboMed XTERN, which she loves. She finds it helped her a lot and is \"way better\" than the AFOs she had been using. Patient feels that her knee pain has improved since using the TurboMed. She reports gaining muscle in her arms and calf. With the TurboMed, Laura feels stable with her stance and walk. She does not use a cane. Patient retired from [email or web address] work last year and have been maintaining an active lifestyle with walks, gardening, and caring for a horse with her granddaughter. Patient denies new symptoms or worsening of weakness, numbness, or tingling in today's visit. She did endorse having increased sensitivity on the bottom of her feet, such that she avoids walking around barefoot. Patient also says she does not have a lot of strengths in her hands in general. Laura did want to discuss an occasional sharp pain on the lateral side of her right leg that radiates from the ankle to her right glutes. Patient worries that this may be sciatica pain. She denies swelling or tenderness in the leg. She says the pain gets better with movement.      Mental state: Alert, appropriate, speech, language, and thought content normal.     Cranial nerves II-XII normal.    Sensory " examination:   Right Left   Light touch Normal Normal   Vibration (timed)     Vibration (Rydell-Seiffer) MM5 MM5   Pin Normal Normal   Position     Legend:   MM = medial malleolus, TT = tibial tuberosity, K = patella, MCP = MCP joint  MF = mid-foot, DC = distal calf, MC = mid calf, PC = proximal calf      Manual muscle testing (A indicates atrophy):   Right Left   Shoulder abduction  5 5   Elbow extension 5 5   Elbow flexion 5 5   Wrist extension  5 5   Finger extension 4 4   FDI 4- 4   APB 3 4-   Hip flexion 5 5   Knee flexion 5 5   Knee extension 5 5   Dorsiflexion 0 0   Plantar flexion 4 4-     Muscle tone:   Right Left   Upper limb Normal Normal   Lower limb Normal Normal        Reflexes:   Right Left   Triceps 2 2   Biceps 2 2   Brachioradialis 2 2   Aditi 2 2   Patellar 2 2   Achilles 2 2   Plantar Flexor Flexor   Clonus Absent Absent      Coordination:  Finger-nose normal.  Heel-shin normal.  RRMs normal.    Gait:  Normal.      Impression:  Laura Vann is doing well generally since her last [facility] visit, even possibly gaining muscle in her leg. The external ankle foot orthosis works really well for her. She can maintain stable stance, normal gait, and an active life with the help of external orthosis. Being active has helped with her knee pain. The pain on the lateral side of her leg is unlikely to be sciatica pain since it radiates up from her ankle. Pain relief with movement, and lack of swelling and tenderness decreases concern for DVT but an ultrasound was performed as a precaution and was negative. We suspect it is a local mechanical problem.       Recommendations:  1. Continue using the TurboMed external AFO   3. Consult physical therapy for muscle pain in leg    2. Patient to return to [facility] clinic for follow up in a year time.       Christa Holguin MS    Attestation: I personally examined the patient. The medical student acted as scribe and her note accurately reflects my findings.     Adrian Francis,  M.D.

## 2019-10-08 NOTE — NURSING NOTE
Laura Vann's goals for this visit include:   Chief Complaint   Patient presents with     RECHECK     1 yr return CMT Clinic/would like to see Orthotist       She requests these members of her care team be copied on today's visit information:     PCP: Tatiana Del Castillo    Referring Provider:  Referred Self, MD  No address on file    BP (!) 148/80 (BP Location: Left arm, Patient Position: Sitting, Cuff Size: Adult Large)   Pulse 60   Wt 81.6 kg (179 lb 12.8 oz)   SpO2 97%   BMI 32.36 kg/m      Do you need any medication refills at today's visit? No

## 2019-10-08 NOTE — PROGRESS NOTES
"S. Patient was seen today, at Dr. Francis's CMT Clinic, for an evaluation for Orthotics.    O.GOAL. To help with foot drop/instability caused by her CMT.    A. Pt. stated that her current Turbomed AFO\"s have worked great for her.  They have really increased her activities in life.  She has no issues with the AFO's, but would like to get some new custom foot orthotics due to general wear.  She needs for relieve to her right medial arch.navicular area.  For this, I have taken biofoam impression for new custom foot orthotics and sent them to our main lab for fabrication.  I have requested an Rx. from Dr. Francis for the new custom inserts.    P. Pt. is scheduled to arrive back to our facility in approx. 3 weeks for the fitting. has been instructed to contact our facility with any future questions and/or concerns.    Raymundo BAL/ALEJANDRO  "

## 2020-11-06 ENCOUNTER — PRE VISIT (OUTPATIENT)
Dept: NEUROLOGY | Facility: CLINIC | Age: 70
End: 2020-11-06

## 2020-11-06 DIAGNOSIS — G60.0 CMT (CHARCOT-MARIE-TOOTH DISEASE): Primary | ICD-10-CM

## 2020-12-04 ENCOUNTER — TELEPHONE (OUTPATIENT)
Dept: NEUROLOGY | Facility: CLINIC | Age: 70
End: 2020-12-04

## 2020-12-04 NOTE — TELEPHONE ENCOUNTER
"CMT RETURN PATIENT    Are their any goals or new issues you would like to address at this appt?     How has your mobility been since the last office visit?   Have you had an increase in falls or any mobility concerns?   Do you have any assistive devices (cane, walker, wheelchair) or are you interested in finding out more about how to obtain these?     Any concerns about hand weakness or pain?                  Do you have trouble doing things around the house such as opening jars, zipping/buttoning, writing or other activities of daily living?     Do you know what type of CMT you have?          Do you have any other questions for the genetic counselor?     Do you wear braces such as foot orthotics or AFOs? If so, how are these working for you? If not, is this something you would like to discuss?      We will have a  available in clinic in case you have any questions. They can help connect you with community resources and discuss disability.     There will also be a representative from the [facility] here in clinic.     To determine if you qualify for any CMT research studies, would you be interested in meeting with the research coordinator?                Verify medications and allergies.     The patient will be scheduled to see:   I called patient and left message for her to call back and discuss needs for appt and the need to change to video appt d/t VANESSA Paez LPN      \"We will contact you once the schedule has been completed to let you know what time you need to arrive. Disregard the automated appointment reminder call, I will call you directly to let you know what time to be here.\"    "

## 2021-04-20 ENCOUNTER — PRE VISIT (OUTPATIENT)
Dept: NEUROLOGY | Facility: CLINIC | Age: 71
End: 2021-04-20

## 2021-04-20 DIAGNOSIS — G60.0 CMT (CHARCOT-MARIE-TOOTH DISEASE): Primary | ICD-10-CM

## 2021-04-20 NOTE — TELEPHONE ENCOUNTER
VM left for pt to call back at earliest convenience to discuss pre-visit questions.       Marah Orozco, RNCC  Neurology

## 2021-04-21 NOTE — TELEPHONE ENCOUNTER
"CMT RETURN PATIENT    Are their any goals or new issues you would like to address at this appt?   o Increased pain in bilateral legs, pt was recommended for R knee replacement  o standing for lengths of time puts pt off balance, if she is on her feet for more than an hour then legs hurt, R leg hurts more than L, reports that L leg may be overcompensating for R side     How has your mobility been since the last office visit? possibly increased weakness in legs but tries to stay active despite limitations d/t pandemic,  feels like she is \"slowing down more\"   Have you had an increase in falls or any mobility concerns? No falls  Do you have any assistive devices (cane, walker, wheelchair) or are you interested in finding out more about how to obtain these? Uses walking sticks, keeps them in the car and uses these for when she is outdoors    Any concerns about hand weakness or pain? none                 Do you have trouble doing things around the house such as opening jars, zipping/buttoning, writing or other activities of daily living? none    Do you know what type of CMT you have? CMT1X         Do you have any other questions for the genetic counselor?     Do you wear braces such as foot orthotics or AFOs? If so, how are these working for you? If not, is this something you would like to discuss?  Yes wears turbo med AFO's, would like to meet with Raymundo again    We will have a  available in clinic in case you have any questions. They can help connect you with community resources and discuss disability. none    There will also be a representative from the [facility] here in clinic. none    To determine if you qualify for any CMT research studies, would you be interested in meeting with the research coordinator? none               Verify medications and allergies.     The patient will be scheduled to see: PT, orthotics    \"We will contact you once the schedule has been completed to let you know what time you need to " "arrive. Disregard the automated appointment reminder call, I will call you directly to let you know what time to be here.\"    "

## 2021-05-05 NOTE — TELEPHONE ENCOUNTER
Pt contacted and informed of arrival time to be 10:30am. Pt updated that neurology clinic is now located on the first floor and to enter via East entrance and check-in at desk .       Marah Orozco, RNCC  Neurology

## 2021-05-11 ENCOUNTER — HOSPITAL ENCOUNTER (OUTPATIENT)
Dept: PHYSICAL THERAPY | Facility: CLINIC | Age: 71
Setting detail: THERAPIES SERIES
End: 2021-05-11
Attending: OCCUPATIONAL THERAPIST
Payer: MEDICARE

## 2021-05-11 ENCOUNTER — OFFICE VISIT (OUTPATIENT)
Dept: NEUROLOGY | Facility: CLINIC | Age: 71
End: 2021-05-11
Payer: COMMERCIAL

## 2021-05-11 ENCOUNTER — DOCUMENTATION ONLY (OUTPATIENT)
Dept: ORTHOPEDICS | Facility: CLINIC | Age: 71
End: 2021-05-11

## 2021-05-11 VITALS
OXYGEN SATURATION: 98 % | BODY MASS INDEX: 31.32 KG/M2 | HEART RATE: 61 BPM | DIASTOLIC BLOOD PRESSURE: 79 MMHG | SYSTOLIC BLOOD PRESSURE: 163 MMHG | WEIGHT: 174 LBS | RESPIRATION RATE: 16 BRPM

## 2021-05-11 DIAGNOSIS — G60.0 CMT (CHARCOT-MARIE-TOOTH DISEASE): Primary | ICD-10-CM

## 2021-05-11 PROCEDURE — 97161 PT EVAL LOW COMPLEX 20 MIN: CPT | Mod: GP | Performed by: PHYSICAL THERAPIST

## 2021-05-11 PROCEDURE — 99213 OFFICE O/P EST LOW 20 MIN: CPT | Performed by: PSYCHIATRY & NEUROLOGY

## 2021-05-11 PROCEDURE — 97110 THERAPEUTIC EXERCISES: CPT | Mod: GP | Performed by: PHYSICAL THERAPIST

## 2021-05-11 RX ORDER — LOSARTAN POTASSIUM 50 MG/1
TABLET ORAL
COMMUNITY
Start: 2021-02-22

## 2021-05-11 RX ORDER — METHIMAZOLE 5 MG/1
5 TABLET ORAL DAILY
COMMUNITY
Start: 2021-05-07

## 2021-05-11 NOTE — PROGRESS NOTES
S.  Pt. was seen today, at Dr. Francis's CMT clinic in [place], for a follow-up/evaluation for bilateral AFO's.    O.Goal.  To help with foot drop due to CMT.    A.  Pt. has had very good success with her last size small Turbomed AFO's but is in need of new AFO's due to general wear.  She feels that the plastic is wearing down and not picking up her feet appropriately due to general wear.  For this, we will check her coverage for new size small turbomed AFO's and contact her with this information.  I will order a pair of size small afo's if she would like to go forward with new afo's.  I have requested an Rx. from Dr. Francis for new braces.    P.  Pt. has been instructed to contact our facility with any future questions and/or concerns.    Raymundo BAL/ALEJANDRO

## 2021-05-11 NOTE — PROGRESS NOTES
Return visit for 70 year old woman with CMT1X. She reports clinical stability. She is medically stable otherwise. [place] with turbo-med braces and a walking stick, without falls. Right knee pain and in need of TKR, but has been avoiding surgery out of concern for risk as well as personal scheduling reasons. No concerns about UE function.       Mental state: Alert, appropriate, speech, language, and thought content normal.     Sensory examination:     Right Left   Light touch MC Ankle   Vibration (timed)     Vibration (Rydell-Seiffer) TT4 MM5   Temp     Pin Normal Normal   Pos Normal    Legend:   MM = medial malleolus, TT = tibial tuberosity, K = patella, MCP = MCP joint  MF = mid-foot, DC = distal calf, MC = mid calf, PC = proximal calf      Motor examination:     Right Left   Shoulder abduction  5 5   Elbow extension 5 5   Elbow flexion 5 5   Wrist extension  5 5   Finger extension 5 5   FDI 4- 4-   APB 2 3   Hip flexion 5 5   Knee flexion 5 5   Knee extension 5 5   Dorsiflexion 0 0   Plantar flexion 4- 1-2   A=atrophy    Tone normal     Gait:  Independent with AFOs, marked right genu valgus.    Impression:  Minimal progression of CMT deficits over time.     Recommendations:  To see PT, orthotist. RTC 1 year or prn. Discussed knee surgery.      Adrian Francis M.D.    20 minutes spent on the date of the encounter on chart review, history and examination, documentation and further activities as noted above.

## 2021-05-11 NOTE — PROGRESS NOTES
OUTPATIENT PHYSICAL THERAPY CLINIC NOTE  Laura Vann  YOB: 1950  [id number]    Type of visit:         Evaluation & Treatment     Date of service: 2021    Referring provider: Dr. Francis    Medical diagnosis:   CMT    Date of diagnosis:     Pertinent history of current problem (include personal factors and/or comorbidities that impact the plan of care): Patient continues to walk daily with her walking stick for 20-30 min and completes daily stretching program; expressing discomfort at night related to ankle weakness & weight of covers. Exhibits significant right knee valgus deformity - has met with orthopedic surgeon and plans to proceed with TKA in next 1-2 years (personal reasons to wait).  PMHx: right ankle fusion, surgery for hammer toes    Living environment:  House    Living environment barriers:  3 stairs to enter (1 railing present) - 1 step without rail  13 stairs within home (1 railing present)     Curent assistance/living environment:  Lives with spouse      Current mobility equipment:  Orthotics: bilateral AFOs (Turbo Meds)  Walking stick     Current ADL equipment:  None    Technology used: cell phone, computer    Evaluation   Interview completed.   Pain assessment:  Pain present  Location: right knee/Ratin/10 at best; 8/10 at worst     Range of motion: bilateral L/E s WFL, except ankle dorsiflexion to ) degrees bilaterally with knee extended   Manual muscle testing: hip flex 4/5 on right, 4+/5 on left; knee ext 4/5 on right, 5/5 on left; knee flexion 4+/5 on right, 5/5 on left; ankle dorsiflexion 0/5 bilaterally; ankle plantar flexion 1/5 on right, 0/5 on left   Gait: independent with bilateral AFOs; severe right knee valgus deformity; good foot clearance bilaterally   25 ft timed walk: 10 steps in 5.90 seconds using bilateral AFOs    Fall Risk Screen:   Has the patient fallen 2 or more times in the last year? No      Has the patient fallen and had an injury in the past year?  No       Timed Up and Go Score: 8.26 seconds with bilateral AFOs    Is the patient a fall risk? Yes, department fall risk interventions implemented     Impairments:  Fatigue  Muscle atrophy  Balance  Range of motion     Treatment diagnosis:  Impaired mobility    Clinical Presentation: Evolving/Changing  Clinical Presentation Rationale: based upon subjective information provided, objective exam findings, medical history & clinical judgement   Clinical Decision Making (Complexity): Low complexity     Recommendations/Plan of care:  1 session evaluation & treatment.     Goals:   Target date: 5/11/2021  Patient will verbalize understanding of evaluation results and implications for functional performance.  Patient will verbalize/demonstrate understanding of home program.    Educational assessment/barriers to learning:   No barriers noted     Treatment provided this date:   Therapeutic procedures, 15 minutes    Response to treatment/recommendations:   Reviewed results of PT exam with patient - TUG score relatively unchanged since last exam (2018); demonstrates improved gait speed & efficiency this date (compared to last exam 2018). Reviewed technique for standing gastroc & soleus stretches - able to return proper stretching technique by end of visit. Instructed in seated toe flexor stretches; demonstrated technique to patient, issued written instructions with illustrations and patient was able to return demonstration of stretching technique to PT by end of visit (goal attained). Provided education regarding importance of monitoring exercise tolerance - patient should feel energized by exercise, not fatigued; patient verbalizes understanding of education provided. Use of illustrations to educate patient regarding options for managing foot/ankle discomfort at night; discussed bed cradle, as well as various positioning splints. Patient verbalizes understanding of education provided and will consider her options - does not  want to request anything from [facility] equipment loan closet at this time.     Goal attainment:  All goals met     Risks and benefits of evaluation/treatment have been explained.  Patient is in agreement with Plan of Care.     Timed Code Treatment Minutes: 15 min  Total Treatment Time (sum of timed and untimed services): 45 min    Signature: Soha Vegas, PT   Date: 5/11/2021    Certification:  Onset date: 4/20/2021  Start of care date: 5/11/2021  Certification date from 5/11/2021 to 6/10/2021    I CERTIFY THE NEED FOR THESE SERVICES FURNISHED UNDER        THIS PLAN OF TREATMENT AND WHILE UNDER MY CARE     (Physician co-signature of this document indicates review and certification of the therapy plan).

## 2022-05-03 ENCOUNTER — PRE VISIT (OUTPATIENT)
Dept: NEUROLOGY | Facility: CLINIC | Age: 72
End: 2022-05-03
Payer: COMMERCIAL

## 2022-05-03 DIAGNOSIS — G60.0 CMT (CHARCOT-MARIE-TOOTH DISEASE): Primary | ICD-10-CM

## 2022-05-03 NOTE — TELEPHONE ENCOUNTER
"CMT RETURN PATIENT    Are their any goals or new issues you would like to address at this appt? Upcoming R total knee placement surgery on May 23rd.     How has your mobility been since the last office visit? Pt feels as if she may have become weaker, she is unsure if this is more related to her  R knee or CMT  Have you had an increase in falls or any mobility concerns? Had a fall about 1 month ago, no serious injury but her knee was very bruised, no broken bones   Do you have any assistive devices (cane, walker, wheelchair) or are you interested in finding out more about how to obtain these? Using hiking stick more often     Any concerns about hand weakness or pain? No changes                  Do you have trouble doing things around the house such as opening jars, zipping/buttoning, writing or other activities of daily living? No changes    Do you know what type of CMT you have? CMT1X         Do you have any other questions for the genetic counselor? none    Do you wear braces such as foot orthotics or AFOs? If so, how are these working for you? If not, is this something you would like to discuss?  She would like to inquire about new orthotics     We will have a  available in clinic in case you have any questions. They can help connect you with community resources and discuss disability. declined    There will also be a representative from the [facility] here in clinic. n/a    To determine if you qualify for any CMT research studies, would you be interested in meeting with the research coordinator? n/a               Verify medications and allergies.     The patient will be scheduled to see: orthotist, PT    \"We will contact you once the schedule has been completed to let you know what time you need to arrive. Disregard the automated appointment reminder call, I will call you directly to let you know what time to be here.\"      MILLIE Capone  Neurology/Neurosurgery/PM&R    "

## 2022-05-06 ENCOUNTER — TELEPHONE (OUTPATIENT)
Dept: NEUROLOGY | Facility: CLINIC | Age: 72
End: 2022-05-06
Payer: COMMERCIAL

## 2022-05-06 NOTE — TELEPHONE ENCOUNTER
[facility] Call Center    Phone Message    May a detailed message be left on voicemail: yes     Reason for Call: Other: Patient is requesting a call back from Mraah of Dr. Francis office,  She has follow up questions.     Action Taken: Message routed to:  [facility]    Travel Screening: Not Applicable

## 2022-05-06 NOTE — TELEPHONE ENCOUNTER
Writer left detailed message informing pt of updated arrival time for CMT Clinic appt. She is to arrive at 10:00am and ignore automated VM/message regarding previous appt time of 10:30am. Pt updated that neurology clinic is now located on the first floor and to check-in at desk C.   She may aldo back if needed.       Marah Orozco, RNCC  Neurology/Neurosurgery/PM&R

## 2022-05-06 NOTE — TELEPHONE ENCOUNTER
Pt wanted to confirm that she will see orthotist at her appt. Writer confirmed that she will see orthotics first, Dr. Francis, and then PT. Pt verbalized understanding.       Marah Orozco RNCC  Neurology/Neurosurgery/PM&R

## 2022-05-10 ENCOUNTER — OFFICE VISIT (OUTPATIENT)
Dept: NEUROLOGY | Facility: CLINIC | Age: 72
End: 2022-05-10
Payer: COMMERCIAL

## 2022-05-10 VITALS
SYSTOLIC BLOOD PRESSURE: 167 MMHG | HEART RATE: 64 BPM | HEIGHT: 63 IN | BODY MASS INDEX: 30.83 KG/M2 | WEIGHT: 174 LBS | DIASTOLIC BLOOD PRESSURE: 83 MMHG

## 2022-05-10 DIAGNOSIS — G60.0 CMT (CHARCOT-MARIE-TOOTH DISEASE): Primary | ICD-10-CM

## 2022-05-10 PROCEDURE — 99213 OFFICE O/P EST LOW 20 MIN: CPT | Performed by: PSYCHIATRY & NEUROLOGY

## 2022-05-10 RX ORDER — CEPHALEXIN 500 MG/1
CAPSULE ORAL
COMMUNITY
Start: 2022-03-29

## 2022-05-10 ASSESSMENT — PAIN SCALES - GENERAL: PAINLEVEL: NO PAIN (0)

## 2022-05-10 NOTE — LETTER
"    5/10/2022         RE: Laura Vann  [id number] [place] [facility] [address]        Dear Colleague,    Thank you for referring your patient, Laura Vann, to the [facility] [place]. Please see a copy of my visit note below.    Laura Vann's goals for this visit include:   Chief Complaint   Patient presents with     RECHECK     1 year CMT follow up, will see PT and orthotics       She requests these members of her care team be copied on today's visit information: yes    PCP: Tatiana Del Castillo    Referring Provider:  No referring provider defined for this encounter.    BP (!) 167/83 (BP Location: Right arm, Patient Position: Right side, Cuff Size: Adult Regular)   Pulse 64   Ht 1.6 m (5' 3\")   Wt 78.9 kg (174 lb)   BMI 30.82 kg/m      Do you need any medication refills at today's visit? No  River Sanz CMA        Return visit for 71 year old woman with CMT1X. She has decided to proceed with TKR, scheduled for later this month. She feels her symptoms are stable. We discussed rationale for surgery and approach to rehabilitation afterwards. She was initially reluctant to proceed with examination, so a directed examination was performed, and not repeated.      Mental state: Alert, appropriate, speech, language, and thought content normal.     Sensory examination:     Right Left   Light touch WBNL WBNL   Vibration (timed) MM6 K4   Vibration (Rydell-Seiffer)     Temp WBNL WBNL   Pin     Pos     Legend:   MM = medial malleolus, TT = tibial tuberosity, K = patella, MCP = MCP joint  MF = mid-foot, DC = distal calf, MC = mid calf, PC = proximal calf      Motor examination:     Right Left   Shoulder abduction  5 5   Elbow extension 5 5   Elbow flexion 5 5   Wrist extension  5 5   Finger extension 5 5   FDI 4- 4-   APB 2 4-   Hip flexion 5 5   Knee flexion 5 5   Knee extension 5 5   Dorsiflexion 0 0   Plantar flexion 0 4   A=atrophy    Tone normal   "     Gait:  Normal.      Impression:  Examination is similar to those performed previously except for possible worsening of right ankle dorsiflexion; however there is some variability possibly related to deafferentation, and she denies a significant subjective change. Fasting glucose has been normal on several occasions, most recently November 2021, and there are no other reported general medical changes.     Recommendations:  No new recommendations. RTC 1 year or prn.    Adrian Francis M.D.      20 minutes spent on the date of the encounter on chart review, history and examination, documentation and further activities as noted above.            Again, thank you for allowing me to participate in the care of your patient.        Sincerely,        Adrian Francis MD

## 2022-05-10 NOTE — PROGRESS NOTES
"Laura Vann's goals for this visit include:   Chief Complaint   Patient presents with     RECHECK     1 year CMT follow up, will see PT and orthotics       She requests these members of her care team be copied on today's visit information: yes    PCP: Tatiana Del Castillo    Referring Provider:  No referring provider defined for this encounter.    BP (!) 167/83 (BP Location: Right arm, Patient Position: Right side, Cuff Size: Adult Regular)   Pulse 64   Ht 1.6 m (5' 3\")   Wt 78.9 kg (174 lb)   BMI 30.82 kg/m      Do you need any medication refills at today's visit? No  River Sanz CMA      "

## 2022-05-10 NOTE — PROGRESS NOTES
Return visit for 71 year old woman with CMT1X. She has decided to proceed with TKR, scheduled for later this month. She feels her symptoms are stable. We discussed rationale for surgery and approach to rehabilitation afterwards. She was initially reluctant to proceed with examination, so a directed examination was performed, and not repeated.      Mental state: Alert, appropriate, speech, language, and thought content normal.     Sensory examination:     Right Left   Light touch WBNL WBNL   Vibration (timed) MM6 K4   Vibration (Rydell-Seiffer)     Temp WBNL WBNL   Pin     Pos     Legend:   MM = medial malleolus, TT = tibial tuberosity, K = patella, MCP = MCP joint  MF = mid-foot, DC = distal calf, MC = mid calf, PC = proximal calf      Motor examination:     Right Left   Shoulder abduction  5 5   Elbow extension 5 5   Elbow flexion 5 5   Wrist extension  5 5   Finger extension 5 5   FDI 4- 4-   APB 2 4-   Hip flexion 5 5   Knee flexion 5 5   Knee extension 5 5   Dorsiflexion 0 0   Plantar flexion 0 4   A=atrophy    Tone normal       Gait:  Normal.      Impression:  Examination is similar to those performed previously except for possible worsening of right ankle dorsiflexion; however there is some variability possibly related to deafferentation, and she denies a significant subjective change. Fasting glucose has been normal on several occasions, most recently November 2021, and there are no other reported general medical changes.     Recommendations:  No new recommendations. RTC 1 year or prn.    Adrian Francis M.D.      20 minutes spent on the date of the encounter on chart review, history and examination, documentation and further activities as noted above.

## 2023-09-19 ENCOUNTER — TELEPHONE (OUTPATIENT)
Dept: NEUROLOGY | Facility: CLINIC | Age: 73
End: 2023-09-19
Payer: COMMERCIAL

## 2023-09-19 DIAGNOSIS — G60.0 CMT (CHARCOT-MARIE-TOOTH DISEASE): Primary | ICD-10-CM

## 2023-09-19 NOTE — TELEPHONE ENCOUNTER
Writer left detailed informing pt that clinic will plan to call her at a later time to confirm CMT clinic appt and go over questions. If she needs to reschedule appt, she may call back.       Marah Orozco, RNCC  Neurology/Neurosurgery

## 2023-09-25 NOTE — TELEPHONE ENCOUNTER
CMT RETURN PATIENT  Do you have any questions/concerns or new issues you would like to address at your appt? No, she feels that she is doing better since her last visit, pt had knee surgery which has helped her mobility/gait   How has your mobility been since the last office visit? She feels her mobility is better since the knee surgery  Have you had an increase in falls or any mobility concerns? Had a couple falls in the last year but no serious injury   Do you know what type of CMT you have? CMT1X        Do you have any other questions for the genetic counselor?   No  Sensory Loss  - Do you have loss of feeling anywhere in your feet or legs? Yes, but this is occasional and worse in the heat   - If so, does the loss of feeling extend above your toes? Yes, but remains only in feet   - Does it extend above the ankle? no  - Can you identify the point where the sensation becomes normal or nearly normal? ankles  - Are these symptoms constant (present all the time), present most of the daytime, less than one-half of the daytime, or just occasionally (Daytime is defined as the time between getting up and going to bed)? On occasion when it is warmer outside    Motor Symptoms- Legs  - Do you have weakness in your legs or feet? yes, but this has improved since knee surgery   - Do you ever trip over your toes/feet or turn or sprain your ankles? yes   - Do your feet slap down on the ground when you walk? yes  - Do you wear shoe inserts/insoles (below the ankle)? no  - Do you wear braces, splints or an equivalent type of orthotic that extends above your ankle? Yes  If so, how are these working for you? Yes, still working but after her knee surgery orthotic fitting differently   - Have the above ankle orthotics described above ever been prescribed or suggested by a healthcare professional? yes  - Have you ever had surgery on your feet or ankles? yes  - If so, do you know if the surgery involved fusion of bones, a transfer of  "tendons, heel cord lengthening or lowering of the arch? Yes back on later 1970's or early 1980's, and hammer toes surgery  - Do you use a cane, walking stick, or walker to help you walk most of the time outside the home? Walking stick for long distances/uneven terrain/hiking If not, do you feel adaptive equipments would be beneficial? N/a  - Do you use a wheelchair most of the time because of weakness? no  Motor Symptoms- Arms  - Do you have difficulty with buttoning clothes (standard shirt buttons)? Yes, has slightly worsened over the years    - If yes, are the difficulties mild or severe (severe includes unable)? Moderate   - Can you cut most food including meat and pizza with normal utensils? yes  - Do you have difficulty with activities that require extending or flexing your arms, or activities using your upper arms? no  We will have a  available in clinic. Would you be interested in discussing any of the following topics: no   - Initiating the disability process  -Transportation issues  -Financial concerns   - Other community resources  Are you registered with the MDA ([employer])? yes   To determine if you qualify for any CMT research studies, would you be interested in meeting with the research coordinator? yes  \"We will contact you once the schedule has been completed to let you know what time you need to arrive. Disregard the automated appointment reminder call, I will call you directly to let you know what time to be here.\"    Recap of services needed: OT, orthotist, research coordinator    MILLIE Capone  Neurology/Neurosurgery    "

## 2023-09-25 NOTE — TELEPHONE ENCOUNTER
Writer left detailed message requesting patient call back to confirm CMT clinic appt and go over pre-screening questions.       Marah Orozco, RNCC  Neurology/Neurosurgery

## 2023-09-25 NOTE — TELEPHONE ENCOUNTER
[facility] Call Center    Phone Message    May a detailed message be left on voicemail: yes     Reason for Call: Other: Pt is returning a call from Marah Orozco RN regarding pre screening questions. Pt confirms her appt on 10/10/23 with Dr. Francis .     Action Taken: Message routed to:  [facility] Neurology     Travel Screening: Not Applicable

## 2023-10-10 ENCOUNTER — OFFICE VISIT (OUTPATIENT)
Dept: NEUROLOGY | Facility: CLINIC | Age: 73
End: 2023-10-10
Payer: COMMERCIAL

## 2023-10-10 ENCOUNTER — DOCUMENTATION ONLY (OUTPATIENT)
Dept: ORTHOPEDICS | Facility: CLINIC | Age: 73
End: 2023-10-10

## 2023-10-10 ENCOUNTER — THERAPY VISIT (OUTPATIENT)
Dept: OCCUPATIONAL THERAPY | Facility: CLINIC | Age: 73
End: 2023-10-10
Attending: PSYCHIATRY & NEUROLOGY
Payer: MEDICARE

## 2023-10-10 VITALS
HEART RATE: 61 BPM | HEIGHT: 63 IN | WEIGHT: 174 LBS | BODY MASS INDEX: 30.83 KG/M2 | SYSTOLIC BLOOD PRESSURE: 131 MMHG | DIASTOLIC BLOOD PRESSURE: 66 MMHG

## 2023-10-10 DIAGNOSIS — G60.0 CMT (CHARCOT-MARIE-TOOTH DISEASE): Primary | ICD-10-CM

## 2023-10-10 DIAGNOSIS — G60.0 CHARCOT-MARIE-TOOTH DISEASE: Primary | ICD-10-CM

## 2023-10-10 PROCEDURE — 99213 OFFICE O/P EST LOW 20 MIN: CPT | Performed by: PSYCHIATRY & NEUROLOGY

## 2023-10-10 PROCEDURE — 97535 SELF CARE MNGMENT TRAINING: CPT | Mod: GO | Performed by: OCCUPATIONAL THERAPIST

## 2023-10-10 PROCEDURE — 97165 OT EVAL LOW COMPLEX 30 MIN: CPT | Mod: GO | Performed by: OCCUPATIONAL THERAPIST

## 2023-10-10 NOTE — NURSING NOTE
"Laura Vann's goals for this visit include:   Chief Complaint   Patient presents with    RECHECK     CMT `1 year follow up         She requests these members of her care team be copied on today's visit information: yes    PCP: Tatiana Del Castillo    Referring Provider:  No referring provider defined for this encounter.    /66   Pulse 61   Ht 1.6 m (5' 3\")   Wt 78.9 kg (174 lb)   BMI 30.82 kg/m      Do you need any medication refills at today's visit? No  LITA Landis, CMA ([facility])      "

## 2023-10-10 NOTE — PROGRESS NOTES
"S. Patient was seen today, at [facility], for an evaluation for Orthotics.    O.GOAL. To help with foot drop/instability caused by her CMT.    A. Pt. stated that her current Turbomed AFO\"s have worked great for her. They have really increased her activities in life. She has no issues with the AFO's, but would like to get some new custom foot orthotics due to general wear. She needs relief to her bilateral medial arch/navicular area. For this, I have taken biofoam impression for new custom foot orthotics and sent them to our main lab for fabrication. I have requested an Rx. from Dr. Francis for the new custom inserts.    P. Pt. is scheduled to arrive back to our facility in approx. 3 weeks for the fitting. has been instructed to contact our facility with any future questions and/or concerns.    Raymundo BAL/ALEJANDRO  "

## 2023-10-10 NOTE — PROGRESS NOTES
OCCUPATIONAL THERAPY EVALUATION  Type of Visit: Evaluation    See electronic medical record for Abuse and Falls Screening details.    Subjective      Presenting condition or subjective complaint:  difficulty with buttons and silverware due to hand weakness  Date of onset: 10/10/23    Relevant medical history:     Dates & types of surgery:        Living Environment  Social support:   wth   Type of home:   house  Stairs to enter the home:       4 with railing  Ramp:   0  Stairs inside the home:       12  Help at home:   helps with jars, jewelry, gardening  Equipment owned:       Employment:    NA  Hobbies/Interests:  gardening, reading, darryl dots, grandchildren    Pain assessment:  occasional pain in wrists from overdoing, is considering getting elastic wrist sleeves to help (this has worked in the past)     Objective     SENSATION:  occasional numbness, temp changes  STRENGTH:  : R 40, L 40   LP: R 4, L 5 3pt: R 0, L 7  MUSCLE TONE:  thenar wasting, intrinsics good    FUNCTIONAL MOBILITY  Assistive Device(s):  walking stick when out and about, none at home  Ambulation: indep    BED MOBILITY: Independent    TRANSFERS: Independent    BATHING: Independent  Equipment: Grab bar, Hand-held shower head, Shower chair/Tub bench    UPPER BODY DRESSING: Independent, struggles with buttons    LOWER BODY DRESSING: Independent    TOILETING: Independent  Equipment: Grab bar    GROOMING: Independent  Equipment: Electric toothbrush    EATING/SELF FEEDING: Independent , some difficulty holding utensils    ACTIVITY TOLERANCE: most days fine, some days exhausted, uses energy conservation    INSTRUMENTAL ACTIVITIES OF DAILY LIVING (IADL): Indep for most, does get some help from  if needed.  Meal Planning/Prep: cooking indep, shopping  Home/Financial Management: has someone do heavy cleaning (vacuum, floors and bathrooms), able to do some but it saves energy to have help  Communication/Computer Use: no  problems  Community Mobility: drives  Care of Others:- taking care of grandkids (doesn't watch baby unless  is home)      Assessment & Plan   CLINICAL IMPRESSIONS  Medical Diagnosis: Charcot-Laura-Tooth    Treatment Diagnosis: Hand weakness    Impression/Assessment:  Pt seems to be managing quite well, able to do her ADLs and IADLs without much assistance. Recommended button hook and foam for built-up handles to use on utensils and toothbrush. Showed her splints for thumb weakness, but she is not at the point where this is necessary. Reviewed adaptive equipment list and provided pt with a copy to take home.    Clinical Decision Making (Complexity):  Assessment of Occupational Performance: 1-3 Performance Deficits  Occupational Performance Limitations: dressing, feeding, and home establishment and management  Clinical Decision Making (Complexity): Low complexity    PLAN OF CARE  Treatment Interventions: NA    Long Term Goals: NA          Frequency of Treatment: eval only  Duration of Treatment: eval only    Recommended Referrals to Other Professionals: NA  Education Assessment: Learner/Method: Patient;Listening;Reading;Demonstration;No Barriers to Learning  Education Comments: Gave handout for adaptive equipment resources.     Risks and benefits of evaluation/treatment have been explained.   Patient/Family/caregiver agrees with Plan of Care.     Evaluation Time:    OT Eval, Low Complexity Minutes (77659): 30    Signing Clinician: SOWMYA Beltrán      [facility]                                                                                   OUTPATIENT OCCUPATIONAL THERAPY      PLAN OF TREATMENT FOR OUTPATIENT REHABILITATION   Patient's Last Name, First Name, Laura Brand YOB: 1950   Provider's Name   [facility]   Medical Record No.  [id number]     Onset Date: 10/10/23 Start of Care Date: 10/10/23     Medical Diagnosis:   Charcot-Laura-Tooth      OT Treatment Diagnosis:  Hand weakness Plan of Treatment  Frequency/Duration:eval only/eval only    Certification date from 10/10/23   To 10/10/23        See note for plan of treatment details and functional goals     Tatiana Andrade OTR                         Referring Provider:  No ref. provider found      Initial Assessment  See Epic Evaluation- 10/10/23

## 2023-10-10 NOTE — PROGRESS NOTES
[facility]  Neuromuscular Consultation  CMT Certified Center of Excellence    Patient Name:  Laura Vann  MRN:  [id number]    :  1950  Date of Service:  October 10, 2023  Primary care provider:  Tatiana Del Castillo      HISTORY OF PRESENT ILLNESS:       Ms. Laura Vann is a 72 year old woman with genetically confirmed CMT 1X.  She presents to the [facility] neuromuscular clinic for follow-up.    She underwent total knee replacement of the right knee last year and it went very well and she has markedly improved mobility and ability to stand for long periods of time since that procedure.  Rehab took a few months, and she still uses some PT and OT exercises at home that have been very helpful.  She also continues to do other home therapies such as getting in and out of chairs without using her arms and does this several times a day with good success over the last few months.  She also does Pilates which is helped with strength and balance in her lower extremities making mobility is here and making her feel more steady.  She rarely has any falls or stumbles especially on normal uneven ground, but occasionally feels off balance on uneven ground.  She uses a walking stick usually only for long walks or uneven ground, but usually can walk without it.  She has bilateral turbo met AFOs that she finds extremely helpful.  She continues to endorse stable weakness of the feet, ankles, hands in the last year.  She has minor increased difficulty with clasps and buttons compared to last year and has her  help her with this, but she is still able to do other things such as use utensils for eating and open most jars.  She did see OT today as well.  She only intermittently gets numbness and or tingling in the feet which she thinks is very much related to heat, as it only is exacerbated during hot and humid days.  She does not experience the sensation change daily.      PMH:  No  "past medical history on file.  No past surgical history on file.    MEDs:   Current Outpatient Medications   Medication    albuterol (2.5 MG/3ML) 0.083% neb solution    albuterol (PROAIR HFA/PROVENTIL HFA/VENTOLIN HFA) 108 (90 Base) MCG/ACT Inhaler    cephALEXin (KEFLEX) 500 MG capsule    chlorhexidine (HIBICLENS) 4 % liquid    estradiol (ESTRACE) 0.1 MG/GM cream    fluticasone (FLONASE) 50 MCG/ACT spray    loratadine-pseudoePHEDrine (CLARITIN-D 12-HOUR) 5-120 MG per 12 hr tablet    losartan (COZAAR) 50 MG tablet    methimazole (TAPAZOLE) 5 MG tablet    mometasone-formoterol (DULERA) 100-5 MCG/ACT oral inhaler    Multiple Vitamins-Minerals (MULTIVITAMIN ADULT PO)    VITAMIN D, CHOLECALCIFEROL, PO     No current facility-administered medications for this visit.                  ALLERGIES:    Allergies   Allergen Reactions    Dust Mites     Ragweeds     Trees        Social Hx:  Social History     Socioeconomic History    Marital status:      Spouse name: Not on file    Number of children: Not on file    Years of education: Not on file    Highest education level: Not on file   Occupational History    Not on file   Tobacco Use    Smoking status: Never    Smokeless tobacco: Never   Substance and Sexual Activity    Alcohol use: Not on file    Drug use: Not on file    Sexual activity: Not on file   Other Topics Concern    Not on file   Social History Narrative    Not on file     Social Determinants of Health     Financial Resource Strain: Not on file   Food Insecurity: Not on file   Transportation Needs: Not on file   Physical Activity: Not on file   Stress: Not on file   Social Connections: Not on file   Interpersonal Safety: Not on file   Housing Stability: Not on file         PFH:  No family history on file.      ROS:  10  point ROS was done as per HPI.   Review of Systems       PHYSICAL EXAMINATION:  Vital Signs: Blood pressure 131/66, pulse 61, height 1.6 m (5' 3\"), weight 78.9 kg (174 lb).    General: Alert, " no distress  HEENT:  normocephalic/atraumatic  Cardio:  RRR  Pulmonary:  no respiratory distress  Extremities:   Mild edema bilaterally in the ankles, otherwise some hammertoe and high arches bilaterally  Skin:  intact, warm/dry     Neurologic exam:   Mental state: Alert, appropriate; speech, language, and thought content normal.     Cranial nerves: II-XII appropriate and without abnormalities      Sensory:   Right Left   Light touch Normal Normal   Vibration (timed) MM5 MM3   Vibration (Rydell-Seiffer) TT5 TT5   Temp     Pin WBNL WBNL   Pos Normal Normal   Legend:   MM = medial malleolus, TT = tibial tuberosity, K = patella, MCP = MCP joint  MF = mid-foot, DC = distal calf, MC = mid calf, PC = proximal calf    Motor:   Right Left   Shoulder abduction  5 5   Elbow extension 5 5   Elbow flexion 5 5   Wrist extension  5 5   Finger extension 5 5   FDI 4- 4-   APB 2 4-   Hip flexion 5 5   Knee flexion 5 5   Knee extension 5 5   Dorsiflexion 0 0   Plantar flexion 4 4-   A=atrophy    Tone: normal     Reflexes:   Right Left   Biceps 1 1   BRD 2 2   Triceps 1 1   Patellar tr tr   Achilles 0 0   Plantar Flexor Flexor   Clonus Absent Absent      Coordination:  Finger-nose normal.  Heel-shin normal.  RRMs normal.    Gait:  Normal.       0 1 2 3 4   Sensory symptoms None Below or at ankle bones Symptoms up to the distal half of the calf Symptoms up to the proximal half of the calf, including knee Symptoms above knee (above the top of the patella)   Motor symptoms - legs None  Trips, catches toes, slaps feet, shoe inserts Ankle support or stabilization needed most of the time for ambulation Walking aids (cane, walker) needed most of the time Wheelchair most of the time   Motor symptoms - arms None Mild difficulty with buttons Severe difficulty or unable to do buttons Unable to cut most foods Proximal weakness (affect movements involving the elbow and above)   Pin sensibility Normal Decreased below or at ankle bones Decreased up  to the distal half of the calf Decreased up to the proximal half of the calf, including knee Decreased above knee (above the  top of the patella)   Vibration  Normal Reduced at great toe Reduced at ankle Reduced at knee (tibial tuberosity) Absent at knee and ankle   Strength - legs Normal 4+, 4, or 4- on foot dorsi- or plantarflexion </= 3 on foot dorsi- or plantarflexion </= 3 on foot dorsi- and plantarflexion Proximal weakness   Strength - arms Normal 4+, 4, or 4- on intrinsic hand muscles </= 3 on intrinsic hand muscles < 5 on wrist extensors Weak above elbow   Ulnar CMAP (Median)        Radial SNAP               ASSESSMENT:  Laura Vann is a 72 year old woman who is seen today for concern for CMT1X.  She has been functionally quite stable and actually improved since last visit with the Right total knee replacement and therapy.  Exam is also quite stable today.      PLAN:   See Orthotics and OT today  Continue doing exercises as tolerated, your balance and strength seems to be very stable over the last year  Follow up in 1 year      Patient was seen and discussed with staff, Dr. Francis.      David Killian MD  Neuromuscular Medicine Fellow, PGY-5  [facility]    I personally examined the patient and concur with the resident's note.     20 minutes spent on the date of the encounter on chart review, history and examination, documentation and further activities as noted above. 2

## 2023-10-10 NOTE — LETTER
10/10/2023         RE: Laura Vann  [id number]  [place] [address]        Dear Colleague,    Thank you for referring your patient, Laura Vann, to the [facility] NEUROLOGY CLINIC [place]. Please see a copy of my visit note below.    [facility]  Neuromuscular Consultation  [facility] Certified Center of Excellence    Patient Name:  Laura Vann  MRN:  [id number]    :  1950  Date of Service:  October 10, 2023  Primary care provider:  Tatiana Del Castillo      HISTORY OF PRESENT ILLNESS:       Ms. Laura Vann is a 72 year old woman with genetically confirmed CMT 1X.  She presents to the [facility] neuromuscular clinic for follow-up.    She underwent total knee replacement of the right knee last year and it went very well and she has markedly improved mobility and ability to stand for long periods of time since that procedure.  Rehab took a few months, and she still uses some PT and OT exercises at home that have been very helpful.  She also continues to do other home therapies such as getting in and out of chairs without using her arms and does this several times a day with good success over the last few months.  She also does Pilates which is helped with strength and balance in her lower extremities making mobility is here and making her feel more steady.  She rarely has any falls or stumbles especially on normal uneven ground, but occasionally feels off balance on uneven ground.  She uses a walking stick usually only for long walks or uneven ground, but usually can walk without it.  She has bilateral turbo met AFOs that she finds extremely helpful.  She continues to endorse stable weakness of the feet, ankles, hands in the last year.  She has minor increased difficulty with clasps and buttons compared to last year and has her  help her with this, but she is still able to do other things such as use utensils for eating and open most jars.  She did  see OT today as well.  She only intermittently gets numbness and or tingling in the feet which she thinks is very much related to heat, as it only is exacerbated during hot and humid days.  She does not experience the sensation change daily.      PMH:  No past medical history on file.  No past surgical history on file.    MEDs:   Current Outpatient Medications   Medication     albuterol (2.5 MG/3ML) 0.083% neb solution     albuterol (PROAIR HFA/PROVENTIL HFA/VENTOLIN HFA) 108 (90 Base) MCG/ACT Inhaler     cephALEXin (KEFLEX) 500 MG capsule     chlorhexidine (HIBICLENS) 4 % liquid     estradiol (ESTRACE) 0.1 MG/GM cream     fluticasone (FLONASE) 50 MCG/ACT spray     loratadine-pseudoePHEDrine (CLARITIN-D 12-HOUR) 5-120 MG per 12 hr tablet     losartan (COZAAR) 50 MG tablet     methimazole (TAPAZOLE) 5 MG tablet     mometasone-formoterol (DULERA) 100-5 MCG/ACT oral inhaler     Multiple Vitamins-Minerals (MULTIVITAMIN ADULT PO)     VITAMIN D, CHOLECALCIFEROL, PO     No current facility-administered medications for this visit.                  ALLERGIES:    Allergies   Allergen Reactions     Dust Mites      Ragweeds      Trees        Social Hx:  Social History     Socioeconomic History     Marital status:      Spouse name: Not on file     Number of children: Not on file     Years of education: Not on file     Highest education level: Not on file   Occupational History     Not on file   Tobacco Use     Smoking status: Never     Smokeless tobacco: Never   Substance and Sexual Activity     Alcohol use: Not on file     Drug use: Not on file     Sexual activity: Not on file   Other Topics Concern     Not on file   Social History Narrative     Not on file     Social Determinants of Health     Financial Resource Strain: Not on file   Food Insecurity: Not on file   Transportation Needs: Not on file   Physical Activity: Not on file   Stress: Not on file   Social Connections: Not on file   Interpersonal Safety: Not on file  "  Housing Stability: Not on file         PFH:  No family history on file.      ROS:  10  point ROS was done as per HPI.   Review of Systems       PHYSICAL EXAMINATION:  Vital Signs: Blood pressure 131/66, pulse 61, height 1.6 m (5' 3\"), weight 78.9 kg (174 lb).    General: Alert, no distress  HEENT:  normocephalic/atraumatic  Cardio:  RRR  Pulmonary:  no respiratory distress  Extremities:   Mild edema bilaterally in the ankles, otherwise some hammertoe and high arches bilaterally  Skin:  intact, warm/dry     Neurologic exam:   Mental state: Alert, appropriate; speech, language, and thought content normal.     Cranial nerves: II-XII appropriate and without abnormalities      Sensory:   Right Left   Light touch Normal Normal   Vibration (timed) MM5 MM3   Vibration (Rydell-Seiffer) TT5 TT5   Temp     Pin WBNL WBNL   Pos Normal Normal   Legend:   MM = medial malleolus, TT = tibial tuberosity, K = patella, MCP = MCP joint  MF = mid-foot, DC = distal calf, MC = mid calf, PC = proximal calf    Motor:   Right Left   Shoulder abduction  5 5   Elbow extension 5 5   Elbow flexion 5 5   Wrist extension  5 5   Finger extension 5 5   FDI 4- 4-   APB 2 4-   Hip flexion 5 5   Knee flexion 5 5   Knee extension 5 5   Dorsiflexion 0 0   Plantar flexion 4 4-   A=atrophy    Tone: normal     Reflexes:   Right Left   Biceps 1 1   BRD 2 2   Triceps 1 1   Patellar tr tr   Achilles 0 0   Plantar Flexor Flexor   Clonus Absent Absent      Coordination:  Finger-nose normal.  Heel-shin normal.  RRMs normal.    Gait:  Normal.       0 1 2 3 4   Sensory symptoms None Below or at ankle bones Symptoms up to the distal half of the calf Symptoms up to the proximal half of the calf, including knee Symptoms above knee (above the top of the patella)   Motor symptoms - legs None  Trips, catches toes, slaps feet, shoe inserts Ankle support or stabilization needed most of the time for ambulation Walking aids (cane, walker) needed most of the time Wheelchair " most of the time   Motor symptoms - arms None Mild difficulty with buttons Severe difficulty or unable to do buttons Unable to cut most foods Proximal weakness (affect movements involving the elbow and above)   Pin sensibility Normal Decreased below or at ankle bones Decreased up to the distal half of the calf Decreased up to the proximal half of the calf, including knee Decreased above knee (above the  top of the patella)   Vibration  Normal Reduced at great toe Reduced at ankle Reduced at knee (tibial tuberosity) Absent at knee and ankle   Strength - legs Normal 4+, 4, or 4- on foot dorsi- or plantarflexion </= 3 on foot dorsi- or plantarflexion </= 3 on foot dorsi- and plantarflexion Proximal weakness   Strength - arms Normal 4+, 4, or 4- on intrinsic hand muscles </= 3 on intrinsic hand muscles < 5 on wrist extensors Weak above elbow   Ulnar CMAP (Median)        Radial SNAP               ASSESSMENT:  Laura Vann is a 72 year old woman who is seen today for concern for CMT1X.  She has been functionally quite stable and actually improved since last visit with the Right total knee replacement and therapy.  Exam is also quite stable today.      PLAN:   See Orthotics and OT today  Continue doing exercises as tolerated, your balance and strength seems to be very stable over the last year  Follow up in 1 year      Patient was seen and discussed with staff, Dr. Francis.      David Killian MD  Neuromuscular Medicine Fellow, PGY-5  [facility]    I personally examined the patient and concur with the resident's note.     20 minutes spent on the date of the encounter on chart review, history and examination, documentation and further activities as noted above. 2      Again, thank you for allowing me to participate in the care of your patient.        Sincerely,        Adrian Francis MD

## 2023-10-10 NOTE — PATIENT INSTRUCTIONS
See Orthotics and OT today  Continue doing exercises as tolerated, your balance and strength seems to be very stable over the last year  Follow up in 1 year

## 2025-06-24 ENCOUNTER — PRE VISIT (OUTPATIENT)
Dept: NEUROLOGY | Facility: CLINIC | Age: 75
End: 2025-06-24
Payer: COMMERCIAL

## 2025-06-24 NOTE — TELEPHONE ENCOUNTER
RN attempted to reach patient to confirm her CMT clinic appointment on 7/8 with Dr. Francis, as well as complete the PVQ. Left a VM with a request for a call back.      MELBA Renteria RN Care Coordinator  Neurology/Neurosurgery/PM&R/Pain Management

## 2025-06-25 NOTE — TELEPHONE ENCOUNTER
Writer spoke with patient. She has a conflict and needed to reschedule her July 2025 CMT clinic appointment. Patient states her symptoms have been stable and was scheduled for the next available return slot, July 14 2026. She was added to the [facility] wait list for the months of May & June 2026 per her request.      ANDRADE RenteriaN RN Care Coordinator  Neurology/Neurosurgery/PM&R/Pain Management